# Patient Record
Sex: FEMALE | Race: AMERICAN INDIAN OR ALASKA NATIVE
[De-identification: names, ages, dates, MRNs, and addresses within clinical notes are randomized per-mention and may not be internally consistent; named-entity substitution may affect disease eponyms.]

---

## 2018-10-16 NOTE — CONSULTATION
History of Present Illness


Consult date: 10/16/18


Requesting physician: KAYLA GUIDRY


Reason for consult: other (Post cardiac arrest, syncope)


History of present illness: 





The pt is a 54 YO female with a past medical history of cardiomyopathy, s/p  

cardiac device (placed 8/2018), ETOH abuse, cocaine use, tobacco use.  She does 

not recall the events which lead to her hospitalization. She works for [employer] 

and was found unresponsive after reported possible fall at work. Per EMS, 

bystander started CPR and applied AED which applied one shock. On EMS arrival 

patient was without a pulse another shock applied per EMS. Return of 

spontaneous circulation achieved per EMS. On arrival to ED, patient  was 

responding to commands and moving all extremities but aphasic. 


She was admitted to the ICU for closer monitoring





Patient was seen and examined.


Vitals, labs,medications, chart and imaging were reviewed.


She is awake, alert, oriented.


Does not remember the immediate events that led to her collapse.





Past History


Past Medical History: heart failure


Past Surgical History: Other (AICD)


Social history: smoking, alcohol abuse, other (h/o cocaine use)





Medications and Allergies


 Allergies











Allergy/AdvReac Type Severity Reaction Status Date / Time


 


No Known Allergies Allergy   Unverified 10/16/18 08:20











Active Meds: 


Active Medications





Acetaminophen (Tylenol)  650 mg PO Q4H PRN


   PRN Reason: Fever >101


Aspirin (Aspirin)  325 mg PO QDAY [facility]


   Last Admin: 10/16/18 09:30 Dose:  325 mg


Heparin Sodium (Porcine) (Heparin)  5,000 unit SUB-Q Q12HR RONY


   Last Admin: 10/16/18 09:30 Dose:  5,000 unit


Ceftriaxone Sodium (Rocephin/Ns 1 Gm/50 Ml)  1 gm in 50 mls @ 100 mls/hr IV 

Q24HR RONY; Protocol


   Last Admin: 10/16/18 14:16 Dose:  100 mls/hr


Magnesium Sulfate (Magnesium Sulfate 4gm/100ml)  4 gm in 100 mls @ 25 mls/hr IV 

ONCE ONE


   Stop: 10/16/18 16:59


   Last Admin: 10/16/18 16:07 Dose:  25 mls/hr


Morphine Sulfate (Morphine)  2 mg IV Q3H PRN


   PRN Reason: Pain, Moderate (4-6)


   Last Admin: 10/16/18 14:35 Dose:  2 mg


Ondansetron HCl (Zofran)  4 mg IV Q8H PRN


   PRN Reason: Nausea And Vomiting


Potassium Chloride (Potassium Chloride)  40 meq PO ONCE ONE


   Stop: 10/16/18 18:01


Sodium Chloride (Sodium Chloride Flush Syringe 10 Ml)  10 ml IV PRN PRN


   PRN Reason: LINE FLUSH











Physical Examination


Vital signs: 


 Vital Signs











Pulse Resp BP Pulse Ox


 


 89   16   165/85   100 


 


 10/15/18 23:55  10/15/18 23:55  10/15/18 23:55  10/15/18 23:55














Results





- Laboratory Findings


CBC and BMP: 


 10/16/18 00:37





 10/16/18 15:00


PT/INR, D-dimer











PT  14.1 Sec. (12.2-14.9)   10/16/18  00:39    


 


INR  1.04  (0.87-1.13)   10/16/18  00:39    


 


D-Dimer  558.12 ng/mlDDU (0-234)  H  10/16/18  15:00    








Abnormal lab findings: 


 Abnormal Labs











  10/16/18 10/16/18 10/16/18





  00:37 00:37 05:48


 


WBC  12.2 H  


 


RBC  3.59 L  


 


MCH  33 H  


 


Mono % (Auto)  8.2 H  


 


Mono #  1.0 H  


 


Seg Neutrophils %  71.8 H  


 


Seg Neutrophils #  8.8 H  


 


D-Dimer   


 


Potassium   2.8 L* 


 


Carbon Dioxide   


 


Creatinine   


 


Glucose   124 H 


 


Total Creatine Kinase    530 H


 


CK-MB (CK-2)    5.9 H














  10/16/18 10/16/18





  15:00 15:00


 


WBC  


 


RBC  


 


MCH  


 


Mono % (Auto)  


 


Mono #  


 


Seg Neutrophils %  


 


Seg Neutrophils #  


 


D-Dimer   558.12 H


 


Potassium  


 


Carbon Dioxide  20 L 


 


Creatinine  0.6 L 


 


Glucose  120 H 


 


Total Creatine Kinase  


 


CK-MB (CK-2)  














Assessment and Plan





-Cardiac arrest with ROSC


Syncope


h/o Cardiomyopathy


h/o substance abuse disorder.


Aspiration pneumonitis








-Continue to monitor in the ICU, plan to transfer telemetry in the morning.


-AICD interrogation per cardiology


-Replete electrolytes


-Keep K>4 and Magnesium at 2


-Mobility


-Cardioprotective measures


-Substance abuse counselling


-Monitor off antibiotics, suspect this is aspiration pneumonitis.


-prn bronchodilators 


-Supplemental oxygen as indicated for O2 sats<88%





The high probability of a clinically significant, sudden or life-threatening 

deterioration of the [cardiac, respiratory] system(s) required my full and 

direct attention, intervention and personal management. The aggregate critical 

care time was [38] minutes without overlap. Time includes spent on;





[x] Data Review and interpretation 





[x] Patient assessment and monitoring of vital signs 





[x] Documentation 





[x] Medication orders and management

## 2018-10-16 NOTE — EVENT NOTE
Date: 10/16/18





Patient did not have access large enough for a CTA.  I was able to use 

ultrasound machine to find a 20-gauge IV in the right ac

## 2018-10-16 NOTE — CAT SCAN REPORT
FINAL REPORT



PROCEDURE:  CT CERVICAL SPINE WO CON



TECHNIQUE:  Computerized tomography of the cervical spine was

performed from the skull base to T1 without contrast material. 



HISTORY:  trauma 



COMPARISON:  No prior studies are available for comparison.



FINDINGS:  

There are no fractures or malalignments. There is no significant

degenerative disc change or facet arthropathy. The skull base and

foramen magnum are intact. Soft tissues are unremarkable. 



Images of the upper thorax demonstrate bilateral pulmonary

infiltrates. 



IMPRESSION:  

There is no acute traumatic bony injury..

## 2018-10-16 NOTE — CAT SCAN REPORT
FINAL REPORT



PROCEDURE:  CT HEAD/BRAIN WO CON



TECHNIQUE:  Computerized tomography of the head was performed

without contrast material. 



HISTORY:  trauma 



COMPARISON:  No prior studies are available for comparison.



FINDINGS:  

Skull and scalp: There is right frontal scalp swelling. There is

no skull fracture. 



Paranasal sinuses: Normal.



Ventricles and subarachnoid spaces: There is mild central and

cortical atrophy. There is no hydrocephalus or asymmetry..



Cerebrum: No evidence of hemorrhage, acute infarction or mass .



Cerebellum and brainstem: No evidence of hemorrhage, acute

infarction or mass.



IMPRESSION:  

There is right frontal scalp swelling. There is no skull

fracture. There is no intracranial hemorrhage.

## 2018-10-16 NOTE — EMERGENCY DEPARTMENT REPORT
<MIRELLA YODER - Last Filed: 10/16/18 05:00>





ED General Adult HPI





- General


Chief complaint: Syncope


Stated complaint: SYNCOPE


Time Seen by Provider: 10/15/18 23:56





- Related Data


 Allergies











Allergy/AdvReac Type Severity Reaction Status Date / Time


 


No Known Allergies Allergy   Unverified 10/16/18 08:20














ED Review of Systems


ROS: 


Stated complaint: SYNCOPE


Other details as noted in HPI








ED Course


 Vital Signs











  10/15/18 10/16/18 10/16/18





  23:55 00:00 00:35


 


Pulse Rate 89 94 H 91 H


 


Respiratory 16 16 14





Rate   


 


Blood Pressure  133/91 165/85


 


Blood Pressure 165/85  





[Left]   


 


O2 Sat by Pulse 100 90 99





Oximetry   














  10/16/18 10/16/18 10/16/18





  00:45 01:00 01:15


 


Pulse Rate 94 H 93 H 97 H


 


Respiratory 16 13 16





Rate   


 


Blood Pressure 136/85 138/80 146/94


 


Blood Pressure   





[Left]   


 


O2 Sat by Pulse 93 100 94





Oximetry   














  10/16/18 10/16/18 10/16/18





  01:30 01:35 01:45


 


Pulse Rate 93 H  91 H


 


Respiratory 10 L 14 16





Rate   


 


Blood Pressure 142/87  136/87


 


Blood Pressure   





[Left]   


 


O2 Sat by Pulse 98 100 99





Oximetry   














  10/16/18 10/16/18 10/16/18





  02:00 02:15 02:31


 


Pulse Rate 87 89 91 H


 


Respiratory 19 13 16





Rate   


 


Blood Pressure 138/80 142/87 142/87


 


Blood Pressure   





[Left]   


 


O2 Sat by Pulse 99 100 100





Oximetry   














  10/16/18 10/16/18 10/16/18





  02:45 03:01 03:15


 


Pulse Rate 93 H 88 89


 


Respiratory 18 18 14





Rate   


 


Blood Pressure 136/87 136/87 136/87


 


Blood Pressure   





[Left]   


 


O2 Sat by Pulse 100 100 100





Oximetry   














  10/16/18 10/16/18 10/16/18





  03:49 04:01 04:15


 


Pulse Rate  93 H 94 H


 


Respiratory  17 23





Rate   


 


Blood Pressure 136/87 136/87 136/87


 


Blood Pressure   





[Left]   


 


O2 Sat by Pulse 99 100 99





Oximetry   














  10/16/18 10/16/18 10/16/18





  04:55 05:00 05:15


 


Pulse Rate 96 H 87 84


 


Respiratory 18 16 17





Rate   


 


Blood Pressure 136/87 146/81 146/81


 


Blood Pressure   





[Left]   


 


O2 Sat by Pulse  98 100





Oximetry   














  10/16/18 10/16/18 10/16/18





  05:30 05:45 06:00


 


Pulse Rate 86 86 89


 


Respiratory 15 18 18





Rate   


 


Blood Pressure 135/83 137/92 138/91


 


Blood Pressure   





[Left]   


 


O2 Sat by Pulse 100 99 94





Oximetry   














  10/16/18 10/16/18 10/16/18





  06:15 06:30 07:00


 


Pulse Rate 83 86 79


 


Respiratory 15 15 15





Rate   


 


Blood Pressure 135/83 127/73 126/71


 


Blood Pressure   





[Left]   


 


O2 Sat by Pulse 100 93 96





Oximetry   














  10/16/18 10/16/18 10/16/18





  07:15 07:30 07:45


 


Pulse Rate 78 78 83


 


Respiratory 14 14 14





Rate   


 


Blood Pressure 123/67 118/69 120/75


 


Blood Pressure   





[Left]   


 


O2 Sat by Pulse 95 99 97





Oximetry   














ED Medical Decision Making





- Lab Data


Result diagrams: 


 10/16/18 00:37





 10/16/18 00:37








 Lab Results











  10/16/18 10/16/18 10/16/18 Range/Units





  00:37 00:37 00:37 


 


WBC  12.2 H    (4.5-11.0)  K/mm3


 


RBC  3.59 L    (3.65-5.03)  M/mm3


 


Hgb  11.9    (10.1-14.3)  gm/dl


 


Hct  34.7    (30.3-42.9)  %


 


MCV  97    (79-97)  fl


 


MCH  33 H    (28-32)  pg


 


MCHC  34    (30-34)  %


 


RDW  14.4    (13.2-15.2)  %


 


Plt Count  163    (140-440)  K/mm3


 


Lymph % (Auto)  18.3    (13.4-35.0)  %


 


Mono % (Auto)  8.2 H    (0.0-7.3)  %


 


Eos % (Auto)  1.4    (0.0-4.3)  %


 


Baso % (Auto)  0.3    (0.0-1.8)  %


 


Lymph #  2.2    (1.2-5.4)  K/mm3


 


Mono #  1.0 H    (0.0-0.8)  K/mm3


 


Eos #  0.2    (0.0-0.4)  K/mm3


 


Baso #  0.0    (0.0-0.1)  K/mm3


 


Seg Neutrophils %  71.8 H    (40.0-70.0)  %


 


Seg Neutrophils #  8.8 H    (1.8-7.7)  K/mm3


 


PT     (12.2-14.9)  Sec.


 


INR     (0.87-1.13)  


 


APTT     (24.2-36.6)  Sec.


 


Thrombin Time     (15.1-19.6)  Sec.


 


Sodium   141   (137-145)  mmol/L


 


Potassium   2.8 L*   (3.6-5.0)  mmol/L


 


Chloride   101.6   ()  mmol/L


 


Carbon Dioxide   25   (22-30)  mmol/L


 


Anion Gap   17   mmol/L


 


BUN   11   (7-17)  mg/dL


 


Creatinine   0.7   (0.7-1.2)  mg/dL


 


Estimated GFR   > 60   ml/min


 


BUN/Creatinine Ratio   16   %


 


Glucose   124 H   ()  mg/dL


 


Calcium   9.5   (8.4-10.2)  mg/dL


 


Troponin T    < 0.010  (0.00-0.029)  ng/mL














  10/16/18 Range/Units





  00:39 


 


WBC   (4.5-11.0)  K/mm3


 


RBC   (3.65-5.03)  M/mm3


 


Hgb   (10.1-14.3)  gm/dl


 


Hct   (30.3-42.9)  %


 


MCV   (79-97)  fl


 


MCH   (28-32)  pg


 


MCHC   (30-34)  %


 


RDW   (13.2-15.2)  %


 


Plt Count   (140-440)  K/mm3


 


Lymph % (Auto)   (13.4-35.0)  %


 


Mono % (Auto)   (0.0-7.3)  %


 


Eos % (Auto)   (0.0-4.3)  %


 


Baso % (Auto)   (0.0-1.8)  %


 


Lymph #   (1.2-5.4)  K/mm3


 


Mono #   (0.0-0.8)  K/mm3


 


Eos #   (0.0-0.4)  K/mm3


 


Baso #   (0.0-0.1)  K/mm3


 


Seg Neutrophils %   (40.0-70.0)  %


 


Seg Neutrophils #   (1.8-7.7)  K/mm3


 


PT  14.1  (12.2-14.9)  Sec.


 


INR  1.04  (0.87-1.13)  


 


APTT  29.2  (24.2-36.6)  Sec.


 


Thrombin Time  15.5  (15.1-19.6)  Sec.


 


Sodium   (137-145)  mmol/L


 


Potassium   (3.6-5.0)  mmol/L


 


Chloride   ()  mmol/L


 


Carbon Dioxide   (22-30)  mmol/L


 


Anion Gap   mmol/L


 


BUN   (7-17)  mg/dL


 


Creatinine   (0.7-1.2)  mg/dL


 


Estimated GFR   ml/min


 


BUN/Creatinine Ratio   %


 


Glucose   ()  mg/dL


 


Calcium   (8.4-10.2)  mg/dL


 


Troponin T   (0.00-0.029)  ng/mL














- Radiology Data





CTA of the head and neck are within normal limits





- Medical Decision Making





The patient is a 53-year-old female who had a episode of cardiac arrest.  

Patient was shocked twice prior to arrival.  Patient arrived alert but unable 

to speak.  Patient was made a code stroke and was sent for his emergent CT of 

the head.  No bleeds were seen.  Was advised that the patient get a CTA of the 

head and neck which also was negative.  Patient has returned back to her 

baseline.  Patient is able to speak in the lower extremities as is.  At the 

time of admission patient's NIH was 0.  Patient also complains some low back 

discomfort and x-ray of been ordered.  Patient be admitted to the hospitalist 

service at this time.


Critical care attestation.: 


If time is entered above; I have spent that time in minutes in the direct care 

of this critically ill patient, excluding procedure time.








ED Disposition


Clinical Impression: 


 Cardiac arrest, Hypokalemia, TIA (transient ischemic attack)





Dysrhythmia, cardiac


Qualifiers:


 Arrhythmia type: unspecified cardiac arrhythmia Qualified Code(s): I49.9 - 

Cardiac arrhythmia, unspecified





Disposition: DC-09 OP ADMIT IP TO THIS HOSP


Is pt being admited?: Yes


Does the pt Need Aspirin: Yes


Condition: Serious


Time of Disposition: 05:05





<XIMENA CARDONA - Last Filed: 10/16/18 17:00>





ED General Adult HPI





- General


Source: patient, EMS


Limitations: Altered Mental Status





- History of Present Illness


Initial comments: 





53-year-old female presents with EMS after being found unresponsive after 

reported possible fall at work.  Per EMS bystander started CPR and applied AED 

which applied one shock.  On EMS arrival patient was without a pulse another 

shock applied per EMS.  Return of spontaneous circulation achieved per EMS.  On 

arrival patient is responding to commands and moving all extremities but 

aphasic.  Frontal hematoma noted on exam pupils equal and reactive c-collar 

placed for precautions.  Patient diaphoretic with urinary incontinence.  CT 

stroke protocol initiated for immediate CT.


MD Complaint: syncope





ED Review of Systems


Comment: Unobtainable due to pts medical conditions





ED Physical Exam





- General


Limitations: Altered Mental Status


General appearance: alert, in distress, other (patient following commands and 

moving all extremities)





- Head


Head exam: Present: other (frontal hematoma noted.  No johnston sign no 

hemotympanum.)





- Eye


Eye exam: Present: PERRL


Pupils: Present: normal accommodation





- ENT


ENT exam: Present: normal orophraynx, mucous membranes moist, TM's normal 

bilaterally





- Neck


Neck exam: Absent: tenderness, meningismus





- Respiratory


Respiratory exam: Present: normal lung sounds bilaterally, other (patient 

protecting airway, gag reflex intact).  Absent: respiratory distress, wheezes, 

rales





- Cardiovascular


Cardiovascular Exam: Present: regular rate, normal heart sounds





- GI/Abdominal


GI/Abdominal exam: Present: soft.  Absent: distended, tenderness, guarding, 

rebound





- Rectal


Rectal exam: Present: deferred





- Extremities Exam


Extremities exam: Present: full ROM.  Absent: pedal edema, joint swelling





- Back Exam


Back exam: Present: normal inspection.  Absent: tenderness





- Neurological Exam


Neurological exam: Present: alert (nonverbal)





- Skin


Skin exam: Present: diaphoretic





ED Course


 Vital Signs











  10/15/18 10/16/18 10/16/18





  23:55 00:00 00:35


 


Pulse Rate 89 94 H 91 H


 


Respiratory 16 16 14





Rate   


 


Blood Pressure  133/91 165/85


 


Blood Pressure 165/85  





[Left]   


 


O2 Sat by Pulse 100 90 99





Oximetry   














  10/16/18 10/16/18 10/16/18





  00:45 01:00 01:15


 


Pulse Rate 94 H 93 H 97 H


 


Respiratory 16 13 16





Rate   


 


Blood Pressure 136/85 138/80 146/94


 


Blood Pressure   





[Left]   


 


O2 Sat by Pulse 93 100 94





Oximetry   














  10/16/18 10/16/18 10/16/18





  01:30 01:35 01:45


 


Pulse Rate 93 H  91 H


 


Respiratory 10 L 14 16





Rate   


 


Blood Pressure 142/87  136/87


 


Blood Pressure   





[Left]   


 


O2 Sat by Pulse 98 100 99





Oximetry   














  10/16/18 10/16/18 10/16/18





  02:00 02:15 02:31


 


Pulse Rate 87 89 91 H


 


Respiratory 19 13 16





Rate   


 


Blood Pressure 138/80 142/87 142/87


 


Blood Pressure   





[Left]   


 


O2 Sat by Pulse 99 100 100





Oximetry   














  10/16/18 10/16/18 10/16/18





  02:45 03:01 03:15


 


Pulse Rate 93 H 88 89


 


Respiratory 18 18 14





Rate   


 


Blood Pressure 136/87 136/87 136/87


 


Blood Pressure   





[Left]   


 


O2 Sat by Pulse 100 100 100





Oximetry   














  10/16/18 10/16/18 10/16/18





  03:49 04:01 04:15


 


Pulse Rate  93 H 94 H


 


Respiratory  17 23





Rate   


 


Blood Pressure 136/87 136/87 136/87


 


Blood Pressure   





[Left]   


 


O2 Sat by Pulse 99 100 99





Oximetry   














  10/16/18 10/16/18 10/16/18





  04:55 05:00 05:15


 


Pulse Rate 96 H 87 84


 


Respiratory 18 16 17





Rate   


 


Blood Pressure 136/87 146/81 146/81


 


Blood Pressure   





[Left]   


 


O2 Sat by Pulse  98 100





Oximetry   














  10/16/18 10/16/18 10/16/18





  05:30 05:45 06:00


 


Pulse Rate 86 86 89


 


Respiratory 15 18 18





Rate   


 


Blood Pressure 135/83 137/92 138/91


 


Blood Pressure   





[Left]   


 


O2 Sat by Pulse 100 99 94





Oximetry   














  10/16/18 10/16/18 10/16/18





  06:15 06:30 07:00


 


Pulse Rate 83 86 79


 


Respiratory 15 15 15





Rate   


 


Blood Pressure 135/83 127/73 126/71


 


Blood Pressure   





[Left]   


 


O2 Sat by Pulse 100 93 96





Oximetry   














  10/16/18 10/16/18 10/16/18





  07:15 07:30 07:45


 


Pulse Rate 78 78 83


 


Respiratory 14 14 14





Rate   


 


Blood Pressure 123/67 118/69 120/75


 


Blood Pressure   





[Left]   


 


O2 Sat by Pulse 95 99 97





Oximetry   














- Reevaluation(s)


Reevaluation #1: 





10/16/18 00:10


Patient alert but nonverbal, protecting airway.  Vital signs reviewed.  Patient 

stable for transport for immediate CT scan.


Reevaluation #2: 





10/16/18 00:58


Patient now alert and oriented 3 and reports of pain in her head over the 

hematoma.  Pt moving all extremities.  Pt denies any sx prior to passing out.  

Pt denies any current pain








- Consultations


Consultation #1: 





10/16/18 00:49


Discussed case with Dr. Tobias (neurology) who agrees with management thus far.  

Agrees no TPA at this time secondary to likely trauma.  Recommendation for CT 

angiogram of head and neck.





ED Medical Decision Making





- Lab Data


Result diagrams: 


 10/16/18 00:37





 10/16/18 15:00





- EKG Data





10/16/18 01:15


Ventricular paced rhythm.  Left bundle branch block.  Rate of 95.





- Medical Decision Making





Reassessment patient is alert and oriented 3 waiting CT angiogram study.  

Patient to be signed out to Dr. Yoder for follow-up on CT studies and 

admission.


Critical care time in (mins) excluding proc time.: 45





- Level of Consciousness


1a. Level of Consciousness: alert/keenly responsive





- LOC Questions


1b. LOC Questions: answers no questions correctly





- LOC Command


1c. LOC Commands: performs tasks correctly





- Best Gaze


2. Best Gaze: partial gaze palsy





- Visual


3. Visual: no visual loss





- Facial Palsy


4. Facial Palsy: normal symmetrical movement





- Motor Arm


5b. Motor Arm Right: no drift


5a. Motor Arm Left: no drift





- Motor Leg


6a. Motor Leg Left: no drift


6b. Motor Leg Right: no drift





- Limb Ataxia


7. Limb Ataxia: absent





- Sensory


8. Sensory: normal





- Best Language


9. Best Language: severe aphasia





- Dysarthria


10. Dysarthria: mild/moderate dysarthria





- Extinction and Inattention


11. Extinction/Inattention: no abnormality (No TPA given secondary to possible 

head trauma)





- Scoring


Total Score: 6


Stroke Severity: Moderate Stroke

## 2018-10-16 NOTE — XRAY REPORT
FINAL REPORT



PROCEDURE:  XR CHEST 1V AP



TECHNIQUE:  Chest radiograph anteroposterior view. CPT 72871







HISTORY:  Chest Pain 



COMPARISON:  No prior studies are available for comparison.



FINDINGS:  

Heart: Heart size is normal. 



Mediastinum/Vessels: Normal.



Lungs/Pleural space: There are infiltrates throughout the right

lung. The left lung is clear. There are no effusions or

pneumothoraces..



Bony thorax: No acute osseous abnormality.



Life support devices: Pacemaker leads are in proper position..



IMPRESSION:  





Heart size is normal. 



There are infiltrates throughout the right lung. The left lung is

clear. There are no effusions or pneumothoraces..



Pacemaker leads are in proper position..



.

## 2018-10-16 NOTE — XRAY REPORT
FINAL REPORT



PROCEDURE:  XR SPINE LUMBOSACRAL 2-3V



TECHNIQUE:  Lumbar spine radiographs, including AP, lateral, and

lumbosacral spot views. CPT 12624 







HISTORY:  fall 



COMPARISON:  No prior studies are available for comparison.



FINDINGS:  

There is mild scoliosis with convexity to the right. There are no

fractures. There is grade 1 anterior spondylolisthesis of L4 over

L5 possibly due to facet arthropathy. 



There is bilateral facet arthropathy at L3-L4, L4-5 and L5-S1.



Disc heights are within normal limits. Soft tissues are

unremarkable. 



IMPRESSION:  

The there are degenerative changes as described. There is no

acute traumatic injury..

## 2018-10-16 NOTE — CAT SCAN REPORT
FINAL REPORT



PROCEDURE:  CT ANGIO HEAD



TECHNIQUE:  Computerized tomographic angiography of the head was

performed after the IV injection of iodinated nonionic contrast

including image processing. The image data was postprocessed

using 2-dimensional multiplanar reformatted (MPR) and

3-dimensional (MIP and/or volume rendered) techniques. 



HISTORY:  cva 



COMPARISON:  CT of the head the 10/15/2018



FINDINGS:  

Intracranial vessels: 



Carotid siphon: There is calcified plaque in the cavernous

portions of the internal carotid arteries without stenosis.



Anterior cerebral: Normal.



Middle cerebral: Normal.



Posterior cerebral:There is fetal origin of the posterior

cerebral arteries which is a normal variation.



Vertebral arteries including basilar: Vertebral arteries and

basilar artery are small in caliber but patent. 



Aneurysms: None.



Dural sinuses: Normal.



IMPRESSION:  

There is no intracranial arterial stenosis or occlusion. There is

no aneurysm or vascular malformation.

## 2018-10-16 NOTE — PROGRESS NOTE
Assessment and Plan


Assessment and plan: 





S/p cardiopulmonary arrest.  Patient with successful resuscitation.  Continue 

per cardiology recommendations.


-Natan negative for AMI. ECG shows NAF. 


-Attempt to obtain [facility] records.


-Follow up echocardiogram


-Interrogate AICD





Cardiomyopathy with AICD in situ.  As above 





History of EtOH/cocaine abuse.  Continue withdrawal protocols.





Pneumonia.  Likely aspiration secondary from cardiopulmonary arrest.  Start IV 

antibiotic for follow-up serial chest x-ray





Hypokalemia.  Replete potassium.





History


Interval history: 





The pt is a 54 YO female with a past medical history of cardiomyopathy, ? AICD 

in situ (placed 8/2018), ETOH abuse, cocaine use, tobacco use.  She is 

regularly followed by [facility]. She does not recall the events which 

lead to her hospitalization. She works for [employer] and was found unresponsive 

after reported possible fall at work. Per EMS, bystander started CPR and 

applied AED which applied one shock. On EMS arrival, patient was without a 

pulse another shock applied per EMS. Return of spontaneous circulation achieved 

per EMS. On arrival to ED, pt was responding to commands and moving all 

extremities but aphasic.  Presently, pt is A&O with no current cardiac 

complaints. Pt denies any prior CAD, AMI or arrhythmias. She does not remember 

the events leading up to her admission. 





Hospitalist Physical





- Constitutional


Vitals: 


 











Temp Pulse Resp BP Pulse Ox


 


    83   14   120/75   96 


 


    10/16/18 07:45  10/16/18 07:45  10/16/18 07:45  10/16/18 08:44











General appearance: Present: no acute distress





- EENT


Eyes: Present: PERRL, EOM intact


ENT: hearing intact, clear oral mucosa, dentition normal





- Neck


Neck: Present: supple, normal ROM





- Respiratory


Respiratory effort: normal


Respiratory: bilateral: CTA





- Cardiovascular


Rhythm: regular


Heart Sounds: Present: S1 & S2.  Absent: gallop, rub





- Extremities


Extremities: no ischemia, No edema, Full ROM





- Abdominal


General gastrointestinal: soft, non-tender, non-distended, normal bowel sounds





- Integumentary


Integumentary: Present: clear, warm, dry





- Neurologic


Neurologic: CNII-XII intact, moves all extremities





Results





- Labs


CBC & Chem 7: 


 10/16/18 00:37





 10/16/18 00:37


Labs: 


 Laboratory Last Values











WBC  12.2 K/mm3 (4.5-11.0)  H  10/16/18  00:37    


 


RBC  3.59 M/mm3 (3.65-5.03)  L  10/16/18  00:37    


 


Hgb  11.9 gm/dl (10.1-14.3)   10/16/18  00:37    


 


Hct  34.7 % (30.3-42.9)   10/16/18  00:37    


 


MCV  97 fl (79-97)   10/16/18  00:37    


 


MCH  33 pg (28-32)  H  10/16/18  00:37    


 


MCHC  34 % (30-34)   10/16/18  00:37    


 


RDW  14.4 % (13.2-15.2)   10/16/18  00:37    


 


Plt Count  163 K/mm3 (140-440)   10/16/18  00:37    


 


Lymph % (Auto)  18.3 % (13.4-35.0)   10/16/18  00:37    


 


Mono % (Auto)  8.2 % (0.0-7.3)  H  10/16/18  00:37    


 


Eos % (Auto)  1.4 % (0.0-4.3)   10/16/18  00:37    


 


Baso % (Auto)  0.3 % (0.0-1.8)   10/16/18  00:37    


 


Lymph #  2.2 K/mm3 (1.2-5.4)   10/16/18  00:37    


 


Mono #  1.0 K/mm3 (0.0-0.8)  H  10/16/18  00:37    


 


Eos #  0.2 K/mm3 (0.0-0.4)   10/16/18  00:37    


 


Baso #  0.0 K/mm3 (0.0-0.1)   10/16/18  00:37    


 


Seg Neutrophils %  71.8 % (40.0-70.0)  H  10/16/18  00:37    


 


Seg Neutrophils #  8.8 K/mm3 (1.8-7.7)  H  10/16/18  00:37    


 


PT  14.1 Sec. (12.2-14.9)   10/16/18  00:39    


 


INR  1.04  (0.87-1.13)   10/16/18  00:39    


 


APTT  29.2 Sec. (24.2-36.6)   10/16/18  00:39    


 


Thrombin Time  15.5 Sec. (15.1-19.6)   10/16/18  00:39    


 


Sodium  141 mmol/L (137-145)   10/16/18  00:37    


 


Potassium  2.8 mmol/L (3.6-5.0)  L*  10/16/18  00:37    


 


Chloride  101.6 mmol/L ()   10/16/18  00:37    


 


Carbon Dioxide  25 mmol/L (22-30)   10/16/18  00:37    


 


Anion Gap  17 mmol/L  10/16/18  00:37    


 


BUN  11 mg/dL (7-17)   10/16/18  00:37    


 


Creatinine  0.7 mg/dL (0.7-1.2)   10/16/18  00:37    


 


Estimated GFR  > 60 ml/min  10/16/18  00:37    


 


BUN/Creatinine Ratio  16 %  10/16/18  00:37    


 


Glucose  124 mg/dL ()  H  10/16/18  00:37    


 


Calcium  9.5 mg/dL (8.4-10.2)   10/16/18  00:37    


 


Magnesium  1.70 mg/dL (1.7-2.3)   10/16/18  05:48    


 


Total Creatine Kinase  530 units/L ()  H  10/16/18  05:48    


 


CK-MB (CK-2)  5.9 ng/mL (0.0-4.0)  H  10/16/18  05:48    


 


CK-MB (CK-2) Rel Index  1.1  (0-4)   10/16/18  05:48    


 


Troponin T  0.022 ng/mL (0.00-0.029)   10/16/18  05:48

## 2018-10-16 NOTE — CONSULTATION
History of Present Illness


Consult date: 10/16/18


Requesting physician: CARLITA ENCARNACION


Consult reason: cardiac arrest, syncope


History of present illness: 


The pt is a 52 YO female with a past medical history of cardiomyopathy, ? AICD 

in situ (placed 8/2018), ETOH abuse, cocaine use, tobacco use. She is 

previously unknown to our practice and reports that she is regularly followed 

by [facility]. She does not recall the events which lead to her 

hospitalization. She works for [employer] and was found unresponsive after reported 

possible fall at work. Per EMS, bystander started CPR and applied AED which 

applied one shock. On EMS arrival patient was without a pulse another shock 

applied per EMS. Return of spontaneous circulation achieved per EMS. On arrival 

to ED, pt was responding to commands and moving all extremities but aphasic. On 

evaluation this AM, pt is A&O with no current cardiac complaints. Pt denies any 

prior CAD, AMI or arrhythmias. She is unsure if her AICD fired yesterday 

because she does not remember the events of yesterday. She does not know which 

company her AICD belongs to.  





Past History


Past Medical History: heart failure


Past Surgical History: Other (AICD)


Social history: smoking, alcohol abuse, other (h/o cocaine use)





Medications and Allergies


 Allergies











Allergy/AdvReac Type Severity Reaction Status Date / Time


 


No Known Allergies Allergy   Unverified 10/16/18 08:20











Active Meds: 


Active Medications





Acetaminophen (Tylenol)  650 mg PO Q4H PRN


   PRN Reason: Fever >101


Aspirin (Aspirin)  325 mg PO QDAY [facility]


   Last Admin: 10/16/18 09:30 Dose:  325 mg


Heparin Sodium (Porcine) (Heparin)  5,000 unit SUB-Q Q12HR [facility]


   Last Admin: 10/16/18 09:30 Dose:  5,000 unit


Morphine Sulfate (Morphine)  2 mg IV Q3H PRN


   PRN Reason: Pain, Moderate (4-6)


Ondansetron HCl (Zofran)  4 mg IV Q8H PRN


   PRN Reason: Nausea And Vomiting


Potassium Chloride (Potassium Chloride)  40 meq PO ONCE ONE


   Stop: 10/16/18 12:01


Potassium Chloride (Potassium Chloride)  40 meq PO ONCE ONE


   Stop: 10/16/18 18:01


Sodium Chloride (Sodium Chloride Flush Syringe 10 Ml)  10 ml IV PRN PRN


   PRN Reason: LINE FLUSH











Review of Systems


All systems: negative


Cardiovascular: syncope, no chest pain, no shortness of breath, no dyspnea on 

exertion


Neurological: syncope





Physical Examination


 Vital Signs











Pulse Resp BP Pulse Ox


 


 89   16   165/85   100 


 


 10/15/18 23:55  10/15/18 23:55  10/15/18 23:55  10/15/18 23:55











General appearance: no acute distress


HEENT: Positive: PERRL, Normocephaly, Mucus Membranes Moist


Neck: Positive: neck supple, trachea midline


Cardiac: Positive: Reg Rate and Rhythm, S1/S2


Lungs: Positive: clear to auscultation


Neuro: Positive: Grossly Intact


Abdomen: Positive: Soft.  Negative: Tender


Skin: Positive: Clear.  Negative: Rash, Wound


Musculoskeletal: No Pain


Extremities: Absent: edema





Results





 10/16/18 00:37





 10/16/18 00:37


 Cardiac Enzymes











  10/16/18 Range/Units





  05:48 


 


CK-MB (CK-2)  5.9 H  (0.0-4.0)  ng/mL








 Coagulation











  10/16/18 Range/Units





  00:39 


 


PT  14.1  (12.2-14.9)  Sec.


 


INR  1.04  (0.87-1.13)  


 


APTT  29.2  (24.2-36.6)  Sec.








 CBC











  10/16/18 Range/Units





  00:37 


 


WBC  12.2 H  (4.5-11.0)  K/mm3


 


RBC  3.59 L  (3.65-5.03)  M/mm3


 


Hgb  11.9  (10.1-14.3)  gm/dl


 


Hct  34.7  (30.3-42.9)  %


 


Plt Count  163  (140-440)  K/mm3


 


Lymph #  2.2  (1.2-5.4)  K/mm3


 


Mono #  1.0 H  (0.0-0.8)  K/mm3


 


Eos #  0.2  (0.0-0.4)  K/mm3


 


Baso #  0.0  (0.0-0.1)  K/mm3








 Comprehensive Metabolic Panel











  10/16/18 Range/Units





  00:37 


 


Sodium  141  (137-145)  mmol/L


 


Potassium  2.8 L*  (3.6-5.0)  mmol/L


 


Chloride  101.6  ()  mmol/L


 


Carbon Dioxide  25  (22-30)  mmol/L


 


BUN  11  (7-17)  mg/dL


 


Creatinine  0.7  (0.7-1.2)  mg/dL


 


Glucose  124 H  ()  mg/dL


 


Calcium  9.5  (8.4-10.2)  mg/dL














- Imaging and Cardiology


Echo: pending


EKG: report reviewed, image reviewed





EKG interpretations





- Telemetry


EKG Rhythm: Paced





- EKG


Ventricular dysrhythmias: ventricular premature com


Pacemaker: atrial pacing w/capture





Assessment and Plan


Natan negative for AMI. ECG shows NAF. 


Attempt to obtain [facility] records. 


Obtain echo. 


Replete K+. 


Interrogate AICD. 





The patient has been seen in conjunction with Dr. Olmstead who agrees with the 

assessment and plan of care. 








- Patient Problems


(1) Cardiac arrest with successful resuscitation


Current Visit: Yes   Status: Acute   





(2) History of cardiomyopathy


Current Visit: Yes   Status: Chronic   





(3) Automatic implantable cardioverter-defibrillator in situ


Current Visit: Yes   Status: Chronic   





(4) History of ETOH abuse


Current Visit: Yes   Status: Chronic   





(5) History of cocaine abuse


Current Visit: Yes   Status: Chronic   





(6) Tobacco use


Current Visit: Yes   Status: Chronic   





(7) Pneumonia


Current Visit: Yes   Status: Suspected   





(8) Hypokalemia


Current Visit: Yes   Status: Acute

## 2018-10-16 NOTE — HISTORY AND PHYSICAL REPORT
CHIEF COMPLAINT:  Syncopal attack.



HISTORY OF PRESENTING ILLNESS:  The patient is a 53-year-old female who was 
noted

to have passed out at work and became unresponsive and the patient says she

could not remember what happened before she passed out and the bystander started

CPR and applied automated defibrillator to patient and shocked her once and then

EMS was called.  When EMS arrived, they found patient with no pulse and gave

another shock and did some CPR on patient and patient regained spontaneous

circulation and patient was brought to the Emergency Room and on arrival to the

Emergency Room, the patient was responding to commands and moving all

extremities but was not talking.  The patient was also noted to be diaphoretic

with urinary incontinence.  The patient was  presented for admission.



PAST MEDICAL HISTORY:  Pertinent for ill-defined heart disease.



PAST SURGICAL HISTORY:  Pertinent for pacemaker placement.



FAMILY HISTORY:  Family history is noncontributory.



SOCIAL HISTORY:  The patient does not smoke, does not drink alcohol and does not

use illicit drugs.



MEDICATIONS:  The patient's home medications are not known at this time.



ALLERGIES:  There are no known drug allergies.



REVIEW OF SYSTEMS:

CONSTITUTIONAL:  There is no fever, no chills.  Diaphoresis is present.

HEENT:  There is no headache or sore throat.

CARDIOVASCULAR:  There is no chest pain or orthopnea.

RESPIRATORY:  There is no shortness of breath or cough.

GASTROINTESTINAL SYSTEM:  There is no nausea, no vomiting, no abdominal pain,

diarrhea, or constipation.

NEUROLOGIC:  Syncopal attack noted, unresponsiveness was noted and aphasia of

speech disturbance noted.

MUSCULOSKELETAL SYSTEM:  There is no joint pain or swelling.

DERMATOLOGICAL SYSTEM:  There is no skin rash or itching.

GENITOURINARY SYSTEM:  There is no dysuria, hematuria, or flank pain.



Rest of system review is normal.



PHYSICAL EXAMINATION:

GENERAL:  At this time of exam, the patient was found to be alert, oriented x 3,

looking weak, but not in acute disease.  

VITAL SIGNS:  Initial vital signs at the time of presentation showed pulse of

89, respiration 16, blood pressure 165/85, O2 saturation of 100% on room air.

HEENT:  Show pupils are equal, round, reactive to light and accommodating. 

Extraocular muscles are intact.  

NECK:  Neck is supple with no JVD or carotid bruit.

CARDIOVASCULAR:  Showed normal first and second heart sounds with no gallops or

murmurs.

RESPIRATORY SYSTEM:  Show good air entry on both sides of the lungs with no

abnormal breath sounds.

GASTROINTESTINAL SYSTEM:  Show abdomen to be full, soft, nontender with no

organomegaly or rigidity.

NEUROLOGICAL:  Neuro exam shows no focal deficits.

MUSCULOSKELETAL:  Shows no joint swelling or tenderness.

DERMATOLOGICAL SYSTEM:  Show no skin rash.

GENITOURINARY:  Showing no costovertebral angle tenderness.



PERTINENT LABORATORY DATA AND IMAGING STUDIES:  The patient had chest done that

shows right lung infiltrate with clear left lung.  The patient has cervical

spine CT done that shows no fractures or acute lesion.  The patient had CT of

the head without contrast done that showed right frontal scalp swelling with no

fracture in the skull and no intracranial hemorrhage.  The patient had a CT

angiogram of head and neck done that were unremarkable and patient's lumbar

spine x-ray showed degenerative joint disease.  The patient's lab result shows

CBC with elevated white count of 12,200 with normal hemoglobin and normal

hematocrit and CBC differential showing elevated segmented neutrophil of 71.8. 

The patient's coagulation studies were unremarkable and chemistry showed low

potassium level of 2.8.



DIAGNOSES:

1.  Post cardiac arrest state.

2.  Syncope.

3.  Hypokalemia.



PLAN OF ACTION:

1.  The patient will be admitted to the ICU.

2.  The patient will have cardiac enzymes involving troponin, total CK and CK-MB

checked q.  6 hours x 2 more levels.  

3.  The patient will have complete echocardiogram done this morning and will

also have bilateral carotid Doppler done because of the syncopal attack.

4.   The patient will have Cardiology consult with Dr. Ellsworth because of

cardiac arrest requiring cardiac shock.  

5.  The patient will have a Critical Care consult with Dr. Martin because of

ICU admission.

6.  The patient will be on p.r.n. medications like Tylenol 650 mg by mouth every

4 hours for fever and headache and aspirin 325 mg daily by mouth.  The patient

will also be on IV morphine 2 mg every 3 hours as needed for pain and IV Zofran

4 mg every 8 hours as needed for nausea and vomiting and patient will have

additional 10 mEq of KCl given in addition to the first order for 10 mEq.





DD: 10/16/2018 06:02

DT: 10/16/2018 07:13

JOB# [id number]  [id number]

OCN/NTS

LAURENCED

## 2018-10-16 NOTE — CAT SCAN REPORT
FINAL REPORT



PROCEDURE:  CT ANGIO NECK



TECHNIQUE:  Computerized tomographic angiography of the neck was

performed after the IV injection of iodinated nonionic contrast

including image processing. The image data was postprocessed

using 2-dimensional multiplanar reformatted (MPR) and

3-dimensional (MIP and/or volume rendered) techniques. 



HISTORY:  cva 



COMPARISON:  No prior studies are available for comparison.



Note: Assessment of carotid artery stenosis is based on 

measurement of the distal internal carotid artery diameter as the

denominator for stenosis calculations and the [employer] American

Symptomatic Carotid Endarterectomy Trial (NASCET) stenosis

criteria . CPT 3100F



FINDINGS:  

Sinuses: Normal .



Non vascular cervical structures: No significant abnormality .



Aortic arch: Normal .



Right carotid artery: Normal .



Left carotid artery: Normal .



Vertebral arteries: Left vertebral artery is slightly dominant.

This is a normal variation..



There are infiltrates at the lung apices.



IMPRESSION:  

There is no cervical carotid or vertebral artery stenosis or

dissection.

## 2018-10-17 NOTE — PROGRESS NOTE
Assessment and Plan


Assessment and plan: 





S/p cardiopulmonary arrest.  Patient with successful resuscitation.  Continue 

per cardiology recommendations.


-Natan negative for AMI. ECG shows NAF. 


-Attempt to obtain [facility] records.


-Echocardiogram revealed left ventricular systolic function severely decreased 

with EF of 20-25%.  Right ventricular global systolic function is normal.  

Right ventricular systolic pressure is calculated at 50 mmHg with evidence of 

moderate pulmonary hypertension


-Interrogate AICD





Pulmonary hypertension.





Cardiomyopathy with AICD in situ.  As above 





History of EtOH/cocaine abuse.  Continue withdrawal protocols.





Pneumonia.  Likely aspiration secondary from cardiopulmonary arrest.  Cont. IV 

antibiotic and follow-up serial chest x-ray





Hypokalemia.  Replete potassium as needed.





History


Interval history: 





The pt is a 52 YO female with a past medical history of cardiomyopathy, ? AICD 

in situ (placed 8/2018), ETOH abuse, cocaine use, tobacco use.  She is 

regularly followed by [facility]. She does not recall the events which 

lead to her hospitalization. She works for [email or web address] and was found unresponsive 

after reported possible fall at work. Per EMS, bystander started CPR and 

applied AED which applied one shock. On EMS arrival, patient was without a 

pulse another shock applied per EMS. Return of spontaneous circulation achieved 

per EMS. On arrival to ED, pt was responding to commands and moving all 

extremities but aphasic.  Presently, pt is A&O with no current cardiac 

complaints. Pt denies any prior CAD, AMI or arrhythmias. She does not remember 

the events leading up to her admission. 





Hospitalist Physical





- Constitutional


Vitals: 


 











Temp Pulse Resp BP Pulse Ox


 


 98.3 F   98 H  12   114/77   92 


 


 10/17/18 08:00  10/17/18 12:30  10/17/18 04:40  10/17/18 12:30  10/17/18 12:30











General appearance: Present: no acute distress





- EENT


Eyes: Present: PERRL, EOM intact


ENT: hearing intact, clear oral mucosa, dentition normal





- Neck


Neck: Present: supple, normal ROM





- Respiratory


Respiratory effort: normal


Respiratory: bilateral: CTA





- Cardiovascular


Rhythm: regular


Heart Sounds: Present: S1 & S2.  Absent: gallop, rub





- Extremities


Extremities: no ischemia, No edema, Full ROM





- Abdominal


General gastrointestinal: soft, non-tender, non-distended, normal bowel sounds





- Integumentary


Integumentary: Present: clear, warm, dry





- Neurologic


Neurologic: CNII-XII intact, moves all extremities





Results





- Labs


CBC & Chem 7: 


 10/16/18 00:37





 10/17/18 07:17


Labs: 


 Laboratory Last Values











WBC  12.2 K/mm3 (4.5-11.0)  H  10/16/18  00:37    


 


RBC  3.59 M/mm3 (3.65-5.03)  L  10/16/18  00:37    


 


Hgb  11.9 gm/dl (10.1-14.3)   10/16/18  00:37    


 


Hct  34.7 % (30.3-42.9)   10/16/18  00:37    


 


MCV  97 fl (79-97)   10/16/18  00:37    


 


MCH  33 pg (28-32)  H  10/16/18  00:37    


 


MCHC  34 % (30-34)   10/16/18  00:37    


 


RDW  14.4 % (13.2-15.2)   10/16/18  00:37    


 


Plt Count  163 K/mm3 (140-440)   10/16/18  00:37    


 


Lymph % (Auto)  18.3 % (13.4-35.0)   10/16/18  00:37    


 


Mono % (Auto)  8.2 % (0.0-7.3)  H  10/16/18  00:37    


 


Eos % (Auto)  1.4 % (0.0-4.3)   10/16/18  00:37    


 


Baso % (Auto)  0.3 % (0.0-1.8)   10/16/18  00:37    


 


Lymph #  2.2 K/mm3 (1.2-5.4)   10/16/18  00:37    


 


Mono #  1.0 K/mm3 (0.0-0.8)  H  10/16/18  00:37    


 


Eos #  0.2 K/mm3 (0.0-0.4)   10/16/18  00:37    


 


Baso #  0.0 K/mm3 (0.0-0.1)   10/16/18  00:37    


 


Seg Neutrophils %  71.8 % (40.0-70.0)  H  10/16/18  00:37    


 


Seg Neutrophils #  8.8 K/mm3 (1.8-7.7)  H  10/16/18  00:37    


 


PT  14.1 Sec. (12.2-14.9)   10/16/18  00:39    


 


INR  1.04  (0.87-1.13)   10/16/18  00:39    


 


APTT  29.2 Sec. (24.2-36.6)   10/16/18  00:39    


 


Thrombin Time  15.5 Sec. (15.1-19.6)   10/16/18  00:39    


 


D-Dimer  558.12 ng/mlDDU (0-234)  H  10/16/18  15:00    


 


Sodium  137 mmol/L (137-145)   10/17/18  07:17    


 


Potassium  4.0 mmol/L (3.6-5.0)   10/17/18  07:17    


 


Chloride  103.2 mmol/L ()   10/17/18  07:17    


 


Carbon Dioxide  24 mmol/L (22-30)   10/17/18  07:17    


 


Anion Gap  14 mmol/L  10/17/18  07:17    


 


BUN  7 mg/dL (7-17)   10/17/18  07:17    


 


Creatinine  0.5 mg/dL (0.7-1.2)  L  10/17/18  07:17    


 


Estimated GFR  > 60 ml/min  10/17/18  07:17    


 


BUN/Creatinine Ratio  14 %  10/17/18  07:17    


 


Glucose  110 mg/dL ()  H  10/17/18  07:17    


 


Calcium  8.8 mg/dL (8.4-10.2)   10/17/18  07:17    


 


Magnesium  1.70 mg/dL (1.7-2.3)   10/16/18  05:48    


 


Total Creatine Kinase  530 units/L ()  H  10/16/18  05:48    


 


CK-MB (CK-2)  5.9 ng/mL (0.0-4.0)  H  10/16/18  05:48    


 


CK-MB (CK-2) Rel Index  1.1  (0-4)   10/16/18  05:48    


 


Troponin T  0.022 ng/mL (0.00-0.029)   10/16/18  05:48    


 


Urine Opiates Screen  Presumptive negative   10/16/18  21:34    


 


Urine Methadone Screen  Presumptive negative   10/16/18  21:34    


 


Ur Barbiturates Screen  Presumptive negative   10/16/18  21:34    


 


Ur Phencyclidine Scrn  Presumptive negative   10/16/18  21:34    


 


Ur Amphetamines Screen  Presumptive negative   10/16/18  21:34    


 


U Benzodiazepines Scrn  Presumptive negative   10/16/18  21:34    


 


Urine Cocaine Screen  Presumptive negative   10/16/18  21:34    


 


U Marijuana (THC) Screen  Presumptive negative   10/16/18  21:34    


 


Drugs of Abuse Note  Disclamer   10/16/18  21:34

## 2018-10-17 NOTE — PROGRESS NOTE
Assessment and Plan


Echo reviewed - EF 20-25%, mild to mod TR, mod pulm HTN with RVSP 58mmHg, mild 

MR, pacemaker wire in RV. Initiate low dose coreg and lisinopril in setting of 

CMP. 


Attempt to obtain [facility] records. 


Interrogate AICD - we are still trying to identify which company the device 

belongs to as the pt does not know. All device companies have been called and 

none have any record of the pt with current name and . Pt denies use of any 

other name and/or . we have called [facility] and are awaiting 

return call.  


Currently stable cardiac status. Pt may tx out of ICU to telemetry from 

cardiology standpoint. 





The patient has been seen in conjunction with Dr. Olmstead who agrees with the 

assessment and plan of care. 








- Patient Problems


(1) Cardiac arrest with successful resuscitation


Current Visit: Yes   Status: Acute   





(2) History of cardiomyopathy


Current Visit: Yes   Status: Chronic   





(3) Automatic implantable cardioverter-defibrillator in situ


Current Visit: Yes   Status: Chronic   





(4) History of ETOH abuse


Current Visit: Yes   Status: Chronic   





(5) History of cocaine abuse


Current Visit: Yes   Status: Chronic   





(6) Tobacco use


Current Visit: Yes   Status: Chronic   





(7) Pneumonia


Current Visit: Yes   Status: Suspected   





(8) Hypokalemia


Current Visit: Yes   Status: Acute   





Subjective


Date of service: 10/17/18


Principal diagnosis: cardiac arrest


Interval history: 


pt resting comfortably in bed, c/o chest soreness secondary to CPR. 








Objective





  Last Vital Signs











Temp  98.3 F   10/17/18 08:00


 


Pulse  98 H  10/17/18 12:30


 


Resp  12   10/17/18 04:40


 


BP  114/77   10/17/18 12:30


 


Pulse Ox  92   10/17/18 12:30

















- Physical Examination


General: No Apparent Distress


HEENT: Positive: PERRL, Normocephaly, Mucus Membranes Moist


Neck: Positive: neck supple, trachea midline


Cardiac: Positive: Reg Rate and Rhythm, S1/S2


Lungs: Positive: clear to auscultation


Neuro: Positive: Grossly Intact


Abdomen: Positive: Soft.  Negative: Tender


Skin: Positive: Clear.  Negative: Rash, Wound


Musculoskeletal: No Pain


Extremities: Absent: edema





- Labs and Meds


 Comprehensive Metabolic Panel











  10/16/18 10/17/18 Range/Units





  15:00 07:17 


 


Sodium  137  137  (137-145)  mmol/L


 


Potassium  4.3  D  4.0  (3.6-5.0)  mmol/L


 


Chloride  101.4  103.2  ()  mmol/L


 


Carbon Dioxide  20 L  24  (22-30)  mmol/L


 


BUN  8  7  (7-17)  mg/dL


 


Creatinine  0.6 L  0.5 L  (0.7-1.2)  mg/dL


 


Glucose  120 H  110 H  ()  mg/dL


 


Calcium  9.1  8.8  (8.4-10.2)  mg/dL














- Imaging and Cardiology


EKG: report reviewed, image reviewed


Echo: pending





- EKG


Ventricular dysrhythmias: ventricular premature com


Pacemaker: atrial pacing w/capture





- Allied health notes


Allied health notes reviewed: nursing

## 2018-10-17 NOTE — PROGRESS NOTE
Assessment and Plan


Cardiac arrest with ROSC


Syncope


h/o Cardiomyopathy


h/o substance abuse disorder.


Aspiration pneumonitis








-Transfer to telemetry


-Replete electrolytes


-Keep K>4 and Magnesium at 2


-Mobility


-Cardio-protective measures


-Substance abuse counselling


-Monitor off antibiotics, suspect this is aspiration pneumonitis.


Discontinue Ceftriaxone


-prn bronchodilators 


-Supplemental oxygen as indicated for O2 sats<88%











Subjective


Date of service: 10/17/18


Interval history: 





Follow up: s/p cardiac arrest with ROSC, syncope, substance abuse disorder





Seen and examined.


Vitals, labs, medications, chart reviewed.


No acute overnight events. Consulted nursing staff


Telemetry shows a paced rhythm.


She denies any shortness of breath, no fevers or chills.


No nausea, no diarrhea. She feels much better


Ambulating in the room, has chest pain which she attributes to chest 

compression.





Objective


 Vital Signs - 12hr











  10/17/18 10/17/18 10/17/18





  00:00 00:10 00:20


 


Temperature   


 


Pulse Rate 77 75 77


 


Respiratory 11 L 12 12





Rate   


 


Blood Pressure 116/72 120/66 120/66


 


O2 Sat by Pulse 98 99 99





Oximetry   














  10/17/18 10/17/18 10/17/18





  00:30 00:40 00:50


 


Temperature   


 


Pulse Rate 78 81 76


 


Respiratory 12 12 11 L





Rate   


 


Blood Pressure 120/66 116/72 116/72


 


O2 Sat by Pulse 99 99 99





Oximetry   














  10/17/18 10/17/18 10/17/18





  01:00 01:10 01:20


 


Temperature   


 


Pulse Rate 75 80 73


 


Respiratory 12 14 12





Rate   


 


Blood Pressure 111/74 111/74 111/74


 


O2 Sat by Pulse 99 100 100





Oximetry   














  10/17/18 10/17/18 10/17/18





  01:30 01:40 01:50


 


Temperature   


 


Pulse Rate 80 77 75


 


Respiratory 11 L 13 12





Rate   


 


Blood Pressure 111/74 111/74 111/74


 


O2 Sat by Pulse 99 100 100





Oximetry   














  10/17/18 10/17/18 10/17/18





  02:00 02:10 02:20


 


Temperature   


 


Pulse Rate 74 77 81


 


Respiratory 12 12 12





Rate   


 


Blood Pressure 100/66 100/66 100/66


 


O2 Sat by Pulse 100 100 100





Oximetry   














  10/17/18 10/17/18 10/17/18





  02:30 02:40 02:50


 


Temperature   


 


Pulse Rate 81 81 75


 


Respiratory 14 12 12





Rate   


 


Blood Pressure 100/66 100/66 100/66


 


O2 Sat by Pulse 100 100 99





Oximetry   














  10/17/18 10/17/18 10/17/18





  03:00 03:10 03:20


 


Temperature   


 


Pulse Rate 79 73 72


 


Respiratory 23 14 12





Rate   


 


Blood Pressure 100/66 116/72 116/72


 


O2 Sat by Pulse 95 96 98





Oximetry   














  10/17/18 10/17/18 10/17/18





  03:30 03:40 03:50


 


Temperature   


 


Pulse Rate 72 80 73


 


Respiratory 14 14 13





Rate   


 


Blood Pressure 116/72 116/72 116/72


 


O2 Sat by Pulse 98 97 98





Oximetry   














  10/17/18 10/17/18 10/17/18





  04:00 04:10 04:20


 


Temperature   


 


Pulse Rate 73 76 75


 


Respiratory 12 13 13





Rate   


 


Blood Pressure 116/72 119/75 119/75


 


O2 Sat by Pulse 98 98 99





Oximetry   














  10/17/18 10/17/18 10/17/18





  04:30 04:40 04:50


 


Temperature   


 


Pulse Rate 79  85


 


Respiratory 15 12 





Rate   


 


Blood Pressure 119/75 119/75 119/75


 


O2 Sat by Pulse 97 97 94





Oximetry   














  10/17/18 10/17/18 10/17/18





  05:00 05:10 05:20


 


Temperature   


 


Pulse Rate 78 81 76


 


Respiratory   





Rate   


 


Blood Pressure 120/71 120/71 120/71


 


O2 Sat by Pulse 97 98 99





Oximetry   














  10/17/18 10/17/18 10/17/18





  05:30 05:40 05:50


 


Temperature   


 


Pulse Rate 78 89 77


 


Respiratory   





Rate   


 


Blood Pressure 120/71 120/71 120/71


 


O2 Sat by Pulse 100 97 98





Oximetry   














  10/17/18 10/17/18 10/17/18





  06:00 06:10 06:53


 


Temperature   


 


Pulse Rate 88 83 


 


Respiratory   





Rate   


 


Blood Pressure 120/73 120/73 


 


O2 Sat by Pulse 98 99 99





Oximetry   














  10/17/18





  08:00


 


Temperature 98.3 F


 


Pulse Rate 


 


Respiratory 





Rate 


 


Blood Pressure 


 


O2 Sat by Pulse 





Oximetry 











Constitutional: no acute distress, other


Eyes: non-icteric


ENT: oropharynx moist


Neck: supple, no lymphadenopathy


Effort: normal


Ascultation: Bilateral: clear


Cardiovascular: regular rate and rhythm, other (paced)


Gastrointestinal: normoactive bowel sounds, soft, non-tender, non-distended


Integumentary: normal


Extremities: no cyanosis, no edema, pink and warm


Neurologic: normal mental status, non-focal exam


Psychiatric: mood appropriate, affect normal


CBC and BMP: 


 10/18/18 04:33





 10/18/18 04:33


ABG, PT/INR, D-dimer: 


PT/INR, D-dimer











PT  14.1 Sec. (12.2-14.9)   10/16/18  00:39    


 


INR  1.04  (0.87-1.13)   10/16/18  00:39    


 


D-Dimer  558.12 ng/mlDDU (0-234)  H  10/16/18  15:00    








Abnormal lab findings: 


 Abnormal Labs











  10/16/18 10/16/18 10/16/18





  00:37 00:37 05:48


 


WBC  12.2 H  


 


RBC  3.59 L  


 


MCH  33 H  


 


Mono % (Auto)  8.2 H  


 


Mono #  1.0 H  


 


Seg Neutrophils %  71.8 H  


 


Seg Neutrophils #  8.8 H  


 


D-Dimer   


 


Potassium   2.8 L* 


 


Carbon Dioxide   


 


Creatinine   


 


Glucose   124 H 


 


Total Creatine Kinase    530 H


 


CK-MB (CK-2)    5.9 H














  10/16/18 10/16/18 10/17/18





  15:00 15:00 07:17


 


WBC   


 


RBC   


 


MCH   


 


Mono % (Auto)   


 


Mono #   


 


Seg Neutrophils %   


 


Seg Neutrophils #   


 


D-Dimer   558.12 H 


 


Potassium   


 


Carbon Dioxide  20 L  


 


Creatinine  0.6 L   0.5 L


 


Glucose  120 H   110 H


 


Total Creatine Kinase   


 


CK-MB (CK-2)   











Allied health notes reviewed: nursing

## 2018-10-18 NOTE — PROGRESS NOTE
Assessment and Plan


Echo findings discussed with pt. She does report a history of "weak heart" and 

states that she underwent heart cath at [facility] although she does not remember 

the results of this study. She denies any known history of CAD, AMI or PCI. 


I have requested [facility] records for the second time. 


Pt states this AM that her device is a pacemaker, not AICD. We are still trying 

to identify which company the device belongs to as the pt does not know. All 

device companies have been called and none have any record of the pt with 

current name and . Pt denies use of any other name and/or . we have 

called [facility] and are awaiting return call.  





The patient has been seen in conjunction with Dr. Olmstead who agrees with the 

assessment and plan of care. 








- Patient Problems


(1) Cardiac arrest with successful resuscitation


Current Visit: Yes   Status: Acute   





(2) History of cardiomyopathy


Current Visit: Yes   Status: Chronic   





(3) Automatic implantable cardioverter-defibrillator in situ


Current Visit: Yes   Status: Chronic   





(4) History of ETOH abuse


Current Visit: Yes   Status: Chronic   





(5) History of cocaine abuse


Current Visit: Yes   Status: Chronic   





(6) Tobacco use


Current Visit: Yes   Status: Chronic   





(7) Pneumonia


Current Visit: Yes   Status: Suspected   





(8) Hypokalemia


Current Visit: Yes   Status: Acute   





Subjective


Date of service: 10/18/18


Principal diagnosis: cardiac arrest


Interval history: 


pt resting comfortably in bed, c/o chest soreness secondary to CPR. 








Objective


 Last Vital Signs











Temp  98.8 F   10/18/18 07:31


 


Pulse  93 H  10/18/18 07:31


 


Resp  16   10/18/18 07:31


 


BP  127/84   10/18/18 07:31


 


Pulse Ox  97   10/18/18 07:31




















- Physical Examination


General: No Apparent Distress


HEENT: Positive: PERRL, Normocephaly, Mucus Membranes Moist


Neck: Positive: neck supple, trachea midline


Cardiac: Positive: Reg Rate and Rhythm, S1/S2


Lungs: Positive: clear to auscultation


Neuro: Positive: Grossly Intact


Abdomen: Positive: Soft.  Negative: Tender


Skin: Positive: Clear.  Negative: Rash, Wound


Musculoskeletal: No Pain


Extremities: Absent: edema





- Labs and Meds


 CBC











  10/18/18 Range/Units





  04:33 


 


WBC  9.0  (4.5-11.0)  K/mm3


 


RBC  3.25 L  (3.65-5.03)  M/mm3


 


Hgb  10.6  (10.1-14.3)  gm/dl


 


Hct  31.2  (30.3-42.9)  %


 


Plt Count  144  (140-440)  K/mm3


 


Lymph #  1.9  (1.2-5.4)  K/mm3


 


Mono #  0.9 H  (0.0-0.8)  K/mm3


 


Eos #  0.2  (0.0-0.4)  K/mm3


 


Baso #  0.0  (0.0-0.1)  K/mm3








 Comprehensive Metabolic Panel











  10/18/18 Range/Units





  04:33 


 


Sodium  138  (137-145)  mmol/L


 


Potassium  4.2  (3.6-5.0)  mmol/L


 


Chloride  102.8  ()  mmol/L


 


Carbon Dioxide  25  (22-30)  mmol/L


 


BUN  9  (7-17)  mg/dL


 


Creatinine  0.6 L  (0.7-1.2)  mg/dL


 


Glucose  113 H  ()  mg/dL


 


Calcium  9.2  (8.4-10.2)  mg/dL














- Imaging and Cardiology


EKG: report reviewed, image reviewed


Echo: report reviewed (EF 20-25%, mild to mod TR, mod pulm HTN with RVSP 58mmHg

, mild MR, pacemaker wire in RV.)





- Telemetry


EKG Rhythm: Paced





- EKG


Ventricular dysrhythmias: ventricular premature com


Pacemaker: atrial pacing w/capture





- Allied health notes


Allied health notes reviewed: nursing

## 2018-10-18 NOTE — PROGRESS NOTE
Assessment and Plan





Patient resting on room air. O2 saturation 98%.Patient complaining right sided 

chest pain from compressions.No acute respiratory distress.





- Patient Problems


(1) Cardiac arrest with successful resuscitation


Current Visit: Yes   Status: Acute   


Plan to address problem: 


Patient alert, awake. Resting on room air.O2 saturation 98%.








(2) TIA (transient ischemic attack)


Current Visit: Yes   Status: Acute   


Plan to address problem: 


Management as per primary care and neurology.








(3) Automatic implantable cardioverter-defibrillator in situ


Current Visit: Yes   Status: Chronic   


Plan to address problem: 


Management as per primary care.








(4) History of ETOH abuse


Current Visit: Yes   Status: Chronic   


Plan to address problem: 


Counselled to stop drinking alcohol.








(5) History of cardiomyopathy


Current Visit: Yes   Status: Chronic   


Plan to address problem: 


Management as per cardiology.








(6) History of cocaine abuse


Current Visit: Yes   Status: Chronic   


Plan to address problem: 


Counselled stop using cocain.








(7) Tobacco use


Current Visit: Yes   Status: Chronic   


Plan to address problem: 


Counselled to stop smoking.








(8) Pulmonary infiltrate in right lung on CXR


Current Visit: Yes   Status: Acute   


Plan to address problem: 


Patient afebrile


 No Leukocytosis


 Denies cough or shortness of breath.








Subjective


Date of service: 10/18/18


Principal diagnosis: cardiac arrest


Interval history: 





Patient resting on room air. O2 saturation 98%.Patient complaining right sided 

chest pain from compressions.No acute respiratory distress.





Objective


 Vital Signs - 12hr











  10/18/18 10/18/18





  04:20 07:31


 


Temperature  98.8 F


 


Pulse Rate 47 L 93 H


 


Respiratory  16





Rate  


 


Blood Pressure 108/78 127/84


 


O2 Sat by Pulse 92 97





Oximetry  











Constitutional: no acute distress, alert, other


Eyes: non-icteric


ENT: oropharynx moist


Neck: supple, no lymphadenopathy


Effort: normal


Ascultation: Bilateral: clear


Cardiovascular: regular rate and rhythm, other (paced)


Gastrointestinal: normoactive bowel sounds, soft, non-tender, non-distended


Integumentary: normal


Extremities: no cyanosis, no edema, pink and warm


Neurologic: normal mental status, non-focal exam


Psychiatric: mood appropriate, affect normal


CBC and BMP: 


 10/18/18 04:33





 10/18/18 04:33


ABG, PT/INR, D-dimer: 


PT/INR, D-dimer











PT  14.1 Sec. (12.2-14.9)   10/16/18  00:39    


 


INR  1.04  (0.87-1.13)   10/16/18  00:39    


 


D-Dimer  558.12 ng/mlDDU (0-234)  H  10/16/18  15:00    








Abnormal lab findings: 


 Abnormal Labs











  10/16/18 10/16/18 10/16/18





  00:37 00:37 05:48


 


WBC  12.2 H  


 


RBC  3.59 L  


 


MCH  33 H  


 


Mono % (Auto)  8.2 H  


 


Mono #  1.0 H  


 


Seg Neutrophils %  71.8 H  


 


Seg Neutrophils #  8.8 H  


 


D-Dimer   


 


Potassium   2.8 L* 


 


Carbon Dioxide   


 


Creatinine   


 


Glucose   124 H 


 


Total Creatine Kinase    530 H


 


CK-MB (CK-2)    5.9 H














  10/16/18 10/16/18 10/17/18





  15:00 15:00 07:17


 


WBC   


 


RBC   


 


MCH   


 


Mono % (Auto)   


 


Mono #   


 


Seg Neutrophils %   


 


Seg Neutrophils #   


 


D-Dimer   558.12 H 


 


Potassium   


 


Carbon Dioxide  20 L  


 


Creatinine  0.6 L   0.5 L


 


Glucose  120 H   110 H


 


Total Creatine Kinase   


 


CK-MB (CK-2)   














  10/18/18 10/18/18





  04:33 04:33


 


WBC  


 


RBC  3.25 L 


 


MCH  33 H 


 


Mono % (Auto)  10.4 H 


 


Mono #  0.9 H 


 


Seg Neutrophils %  


 


Seg Neutrophils #  


 


D-Dimer  


 


Potassium  


 


Carbon Dioxide  


 


Creatinine   0.6 L


 


Glucose   113 H


 


Total Creatine Kinase  


 


CK-MB (CK-2)  











Chest x-ray: report reviewed (Infiltrate right lung.), image reviewed


Allied health notes reviewed: nursing

## 2018-10-18 NOTE — PROGRESS NOTE
Assessment and Plan


Assessment and plan: 





S/p cardiopulmonary arrest.  Patient with successful resuscitation.  Continue 

per cardiology recommendations.


-Natan negative for AMI. ECG shows NAF. 


-Attempt to obtain [facility] records.


-Echocardiogram revealed left ventricular systolic function severely decreased 

with EF of 20-25%.  Right ventricular global systolic function is normal.  

Right ventricular systolic pressure is calculated at 50 mmHg with evidence of 

moderate pulmonary hypertension


-Interrogate AICD





Pulmonary hypertension.





Cardiomyopathy with AICD in situ.  As above 





History of EtOH/cocaine abuse.  Continue withdrawal protocols.





Pneumonia.  Likely aspiration secondary from cardiopulmonary arrest.  Cont. IV 

antibiotic and follow-up serial chest x-ray





Hypokalemia.  Replete potassium as needed.





History


Interval history: 





The pt is a 54 YO female with a past medical history of cardiomyopathy, ? AICD 

in situ (placed 8/2018), ETOH abuse, cocaine use, tobacco use.  She is 

regularly followed by [facility]. She does not recall the events which 

lead to her hospitalization. She works for [employer] and was found unresponsive 

after reported possible fall at work. Per EMS, bystander started CPR and 

applied AED which applied one shock. On EMS arrival, patient was without a 

pulse another shock applied per EMS. Return of spontaneous circulation achieved 

per EMS. On arrival to ED, pt was responding to commands and moving all 

extremities but aphasic.  Presently, pt is A&O with no current cardiac 

complaints. Pt denies any prior CAD, AMI or arrhythmias. She does not remember 

the events leading up to her admission. 





Hospitalist Physical





- Constitutional


Vitals: 


 











Temp Pulse Resp BP Pulse Ox


 


 98.8 F   93 H  16   127/84   97 


 


 10/18/18 07:31  10/18/18 07:31  10/18/18 07:31  10/18/18 07:31  10/18/18 07:31











General appearance: Present: no acute distress





- EENT


Eyes: Present: PERRL, EOM intact


ENT: hearing intact, clear oral mucosa, dentition normal





- Neck


Neck: Present: supple, normal ROM





- Respiratory


Respiratory effort: normal


Respiratory: bilateral: CTA





- Cardiovascular


Rhythm: regular


Heart Sounds: Present: S1 & S2.  Absent: gallop, rub





- Extremities


Extremities: no ischemia, No edema, Full ROM





- Abdominal


General gastrointestinal: soft, non-tender, non-distended, normal bowel sounds





- Integumentary


Integumentary: Present: clear, warm, dry





- Neurologic


Neurologic: CNII-XII intact, moves all extremities





Results





- Labs


CBC & Chem 7: 


 10/18/18 04:33





 10/18/18 04:33


Labs: 


 Laboratory Last Values











WBC  9.0 K/mm3 (4.5-11.0)   10/18/18  04:33    


 


RBC  3.25 M/mm3 (3.65-5.03)  L  10/18/18  04:33    


 


Hgb  10.6 gm/dl (10.1-14.3)   10/18/18  04:33    


 


Hct  31.2 % (30.3-42.9)   10/18/18  04:33    


 


MCV  96 fl (79-97)   10/18/18  04:33    


 


MCH  33 pg (28-32)  H  10/18/18  04:33    


 


MCHC  34 % (30-34)   10/18/18  04:33    


 


RDW  13.8 % (13.2-15.2)   10/18/18  04:33    


 


Plt Count  144 K/mm3 (140-440)   10/18/18  04:33    


 


Lymph % (Auto)  21.2 % (13.4-35.0)   10/18/18  04:33    


 


Mono % (Auto)  10.4 % (0.0-7.3)  H  10/18/18  04:33    


 


Eos % (Auto)  2.0 % (0.0-4.3)   10/18/18  04:33    


 


Baso % (Auto)  0.5 % (0.0-1.8)   10/18/18  04:33    


 


Lymph #  1.9 K/mm3 (1.2-5.4)   10/18/18  04:33    


 


Mono #  0.9 K/mm3 (0.0-0.8)  H  10/18/18  04:33    


 


Eos #  0.2 K/mm3 (0.0-0.4)   10/18/18  04:33    


 


Baso #  0.0 K/mm3 (0.0-0.1)   10/18/18  04:33    


 


Seg Neutrophils %  65.9 % (40.0-70.0)   10/18/18  04:33    


 


Seg Neutrophils #  5.9 K/mm3 (1.8-7.7)   10/18/18  04:33    


 


PT  14.1 Sec. (12.2-14.9)   10/16/18  00:39    


 


INR  1.04  (0.87-1.13)   10/16/18  00:39    


 


APTT  29.2 Sec. (24.2-36.6)   10/16/18  00:39    


 


Thrombin Time  15.5 Sec. (15.1-19.6)   10/16/18  00:39    


 


D-Dimer  558.12 ng/mlDDU (0-234)  H  10/16/18  15:00    


 


Sodium  138 mmol/L (137-145)   10/18/18  04:33    


 


Potassium  4.2 mmol/L (3.6-5.0)   10/18/18  04:33    


 


Chloride  102.8 mmol/L ()   10/18/18  04:33    


 


Carbon Dioxide  25 mmol/L (22-30)   10/18/18  04:33    


 


Anion Gap  14 mmol/L  10/18/18  04:33    


 


BUN  9 mg/dL (7-17)   10/18/18  04:33    


 


Creatinine  0.6 mg/dL (0.7-1.2)  L  10/18/18  04:33    


 


Estimated GFR  > 60 ml/min  10/18/18  04:33    


 


BUN/Creatinine Ratio  15 %  10/18/18  04:33    


 


Glucose  113 mg/dL ()  H  10/18/18  04:33    


 


Calcium  9.2 mg/dL (8.4-10.2)   10/18/18  04:33    


 


Magnesium  1.70 mg/dL (1.7-2.3)   10/16/18  05:48    


 


Total Creatine Kinase  530 units/L ()  H  10/16/18  05:48    


 


CK-MB (CK-2)  5.9 ng/mL (0.0-4.0)  H  10/16/18  05:48    


 


CK-MB (CK-2) Rel Index  1.1  (0-4)   10/16/18  05:48    


 


Troponin T  0.022 ng/mL (0.00-0.029)   10/16/18  05:48    


 


Urine Opiates Screen  Presumptive negative   10/16/18  21:34    


 


Urine Methadone Screen  Presumptive negative   10/16/18  21:34    


 


Ur Barbiturates Screen  Presumptive negative   10/16/18  21:34    


 


Ur Phencyclidine Scrn  Presumptive negative   10/16/18  21:34    


 


Ur Amphetamines Screen  Presumptive negative   10/16/18  21:34    


 


U Benzodiazepines Scrn  Presumptive negative   10/16/18  21:34    


 


Urine Cocaine Screen  Presumptive negative   10/16/18  21:34    


 


U Marijuana (THC) Screen  Presumptive negative   10/16/18  21:34    


 


Drugs of Abuse Note  Disclamer   10/16/18  21:34

## 2018-10-19 NOTE — PROGRESS NOTE
Assessment and Plan


[facility] records received - pt has [employer] pacemaker which was placed in 8/2018 

for complete heart block. She has a history of NICMP with EF 20% in 2017 

improved to 50% in April 2018. She did not receive AICD in 8/2018 in setting of 

normalized EF. She underwent LHC 8/2018 which showed luminal irregularities, 

long 30% proximal stenosis of left main. Echo done 8/2018 showed EF 50-55%. 

Will interrogate PPM. Also, given recent reduction in EF, will repeat ischemic 

evaluation - will plan for lexiscan MPI stress test in AM. Lastly, pt may 

require LifeVest placement pending PPM interrogation results. Assessment and 

plan reviewed with pt at bedside. 





The patient has been seen in conjunction with Dr. Olmstead who agrees with the 

assessment and plan of care. 








- Patient Problems


(1) Cardiac arrest with successful resuscitation


Current Visit: Yes   Status: Acute   





(2) History of cardiomyopathy


Current Visit: Yes   Status: Chronic   





(3) Automatic implantable cardioverter-defibrillator in situ


Current Visit: Yes   Status: Chronic   





(4) History of ETOH abuse


Current Visit: Yes   Status: Chronic   





(5) History of cocaine abuse


Current Visit: Yes   Status: Chronic   





(6) Tobacco use


Current Visit: Yes   Status: Chronic   





(7) Pneumonia


Current Visit: Yes   Status: Suspected   





(8) Hypokalemia


Current Visit: Yes   Status: Acute   





Subjective


Date of service: 10/19/18


Principal diagnosis: cardiac arrest


Interval history: 


pt resting comfortably in bed, c/o chest soreness secondary to CPR. 








Objective





  Last Vital Signs











Temp  97.9 F   10/19/18 11:14


 


Pulse  87   10/19/18 11:14


 


Resp  18   10/19/18 11:14


 


BP  115/79   10/19/18 11:14


 


Pulse Ox  100   10/19/18 11:14














- Physical Examination


General: No Apparent Distress


HEENT: Positive: PERRL, Normocephaly, Mucus Membranes Moist


Neck: Positive: neck supple, trachea midline


Cardiac: Positive: Reg Rate and Rhythm, S1/S2


Lungs: Positive: clear to auscultation


Neuro: Positive: Grossly Intact


Abdomen: Positive: Soft.  Negative: Tender


Skin: Positive: Clear.  Negative: Rash, Wound


Musculoskeletal: No Pain


Extremities: Absent: edema





- Imaging and Cardiology


EKG: report reviewed, image reviewed


Echo: report reviewed (EF 20-25%, mild to mod TR, mod pulm HTN with RVSP 58mmHg

, mild MR, pacemaker wire in RV.)





- Telemetry


EKG Rhythm: Sinus Rhythm





- EKG


Ventricular dysrhythmias: ventricular premature com


Pacemaker: atrial pacing w/capture





- Allied health notes


Allied health notes reviewed: nursing

## 2018-10-19 NOTE — EVENT NOTE
Date: 10/19/18


PPM interrogation reveals 5min 30sec run of ventricular fibrillation which 

corresponds to pt's syncopal episode/cardiac arrest in the field. Cancel stress 

test. Pt may require coronary angiogram. Additionally, pt may require upgrade 

of PPM to AICD. Jason d/w TOMAS MAYS NP / DR. REYES

## 2018-10-19 NOTE — PROGRESS NOTE
Assessment and Plan


Assessment and plan: 





S/p cardiopulmonary arrest.  Patient with successful resuscitation.  Continue 

per cardiology recommendations.


-Natan negative for AMI. ECG shows NAF. 


-Attempt to obtain [facility] records.


-Echocardiogram revealed left ventricular systolic function severely decreased 

with EF of 20-25%.  Right ventricular global systolic function is normal.  

Right ventricular systolic pressure is calculated at 50 mmHg with evidence of 

moderate pulmonary hypertension


-Interrogate AICD





Pulmonary hypertension.





Cardiomyopathy with AICD in situ.  As above 





History of EtOH/cocaine abuse.  Continue withdrawal protocols.





Pneumonia.  Likely aspiration secondary from cardiopulmonary arrest.  Cont. IV 

antibiotic and follow-up serial chest x-ray





Hypokalemia.  Replete potassium as needed.





History


Interval history: 





The pt is a 54 YO female with a past medical history of cardiomyopathy, ? AICD 

in situ (placed 8/2018), ETOH abuse, cocaine use, tobacco use.  She is 

regularly followed by [facility] [facility]. She does not recall the events which 

lead to her hospitalization. She works for [employer] and was found unresponsive 

after reported possible fall at work. Per EMS, bystander started CPR and 

applied AED which applied one shock. On EMS arrival, patient was without a 

pulse another shock applied per EMS. Return of spontaneous circulation achieved 

per EMS. On arrival to ED, pt was responding to commands and moving all 

extremities but aphasic.  Presently, pt is A&O with no current cardiac 

complaints. Pt denies any prior CAD, AMI or arrhythmias. She does not remember 

the events leading up to her admission. 





Hospitalist Physical





- Constitutional


Vitals: 


 











Temp Pulse Resp BP Pulse Ox


 


 98.7 F   93 H  18   120/80   97 


 


 10/19/18 07:43  10/19/18 07:43  10/19/18 07:43  10/19/18 07:43  10/19/18 08:37











General appearance: Present: no acute distress





- EENT


Eyes: Present: PERRL, EOM intact


ENT: hearing intact, clear oral mucosa, dentition normal





- Neck


Neck: Present: supple, normal ROM





- Respiratory


Respiratory effort: normal


Respiratory: bilateral: CTA





- Cardiovascular


Rhythm: regular


Heart Sounds: Present: S1 & S2.  Absent: gallop, rub





- Extremities


Extremities: no ischemia, No edema, Full ROM





- Abdominal


General gastrointestinal: soft, non-tender, non-distended, normal bowel sounds





- Integumentary


Integumentary: Present: clear, warm, dry





- Neurologic


Neurologic: CNII-XII intact, moves all extremities





Results





- Labs


CBC & Chem 7: 


 10/18/18 04:33





 10/18/18 04:33


Labs: 


 Laboratory Last Values











WBC  9.0 K/mm3 (4.5-11.0)   10/18/18  04:33    


 


RBC  3.25 M/mm3 (3.65-5.03)  L  10/18/18  04:33    


 


Hgb  10.6 gm/dl (10.1-14.3)   10/18/18  04:33    


 


Hct  31.2 % (30.3-42.9)   10/18/18  04:33    


 


MCV  96 fl (79-97)   10/18/18  04:33    


 


MCH  33 pg (28-32)  H  10/18/18  04:33    


 


MCHC  34 % (30-34)   10/18/18  04:33    


 


RDW  13.8 % (13.2-15.2)   10/18/18  04:33    


 


Plt Count  144 K/mm3 (140-440)   10/18/18  04:33    


 


Lymph % (Auto)  21.2 % (13.4-35.0)   10/18/18  04:33    


 


Mono % (Auto)  10.4 % (0.0-7.3)  H  10/18/18  04:33    


 


Eos % (Auto)  2.0 % (0.0-4.3)   10/18/18  04:33    


 


Baso % (Auto)  0.5 % (0.0-1.8)   10/18/18  04:33    


 


Lymph #  1.9 K/mm3 (1.2-5.4)   10/18/18  04:33    


 


Mono #  0.9 K/mm3 (0.0-0.8)  H  10/18/18  04:33    


 


Eos #  0.2 K/mm3 (0.0-0.4)   10/18/18  04:33    


 


Baso #  0.0 K/mm3 (0.0-0.1)   10/18/18  04:33    


 


Seg Neutrophils %  65.9 % (40.0-70.0)   10/18/18  04:33    


 


Seg Neutrophils #  5.9 K/mm3 (1.8-7.7)   10/18/18  04:33    


 


PT  14.1 Sec. (12.2-14.9)   10/16/18  00:39    


 


INR  1.04  (0.87-1.13)   10/16/18  00:39    


 


APTT  29.2 Sec. (24.2-36.6)   10/16/18  00:39    


 


Thrombin Time  15.5 Sec. (15.1-19.6)   10/16/18  00:39    


 


D-Dimer  558.12 ng/mlDDU (0-234)  H  10/16/18  15:00    


 


Sodium  138 mmol/L (137-145)   10/18/18  04:33    


 


Potassium  4.2 mmol/L (3.6-5.0)   10/18/18  04:33    


 


Chloride  102.8 mmol/L ()   10/18/18  04:33    


 


Carbon Dioxide  25 mmol/L (22-30)   10/18/18  04:33    


 


Anion Gap  14 mmol/L  10/18/18  04:33    


 


BUN  9 mg/dL (7-17)   10/18/18  04:33    


 


Creatinine  0.6 mg/dL (0.7-1.2)  L  10/18/18  04:33    


 


Estimated GFR  > 60 ml/min  10/18/18  04:33    


 


BUN/Creatinine Ratio  15 %  10/18/18  04:33    


 


Glucose  113 mg/dL ()  H  10/18/18  04:33    


 


Calcium  9.2 mg/dL (8.4-10.2)   10/18/18  04:33    


 


Magnesium  1.70 mg/dL (1.7-2.3)   10/16/18  05:48    


 


Total Creatine Kinase  530 units/L ()  H  10/16/18  05:48    


 


CK-MB (CK-2)  5.9 ng/mL (0.0-4.0)  H  10/16/18  05:48    


 


CK-MB (CK-2) Rel Index  1.1  (0-4)   10/16/18  05:48    


 


Troponin T  0.022 ng/mL (0.00-0.029)   10/16/18  05:48    


 


Urine Opiates Screen  Presumptive negative   10/16/18  21:34    


 


Urine Methadone Screen  Presumptive negative   10/16/18  21:34    


 


Ur Barbiturates Screen  Presumptive negative   10/16/18  21:34    


 


Ur Phencyclidine Scrn  Presumptive negative   10/16/18  21:34    


 


Ur Amphetamines Screen  Presumptive negative   10/16/18  21:34    


 


U Benzodiazepines Scrn  Presumptive negative   10/16/18  21:34    


 


Urine Cocaine Screen  Presumptive negative   10/16/18  21:34    


 


U Marijuana (THC) Screen  Presumptive negative   10/16/18  21:34    


 


Drugs of Abuse Note  Disclamer   10/16/18  21:34

## 2018-10-19 NOTE — PROGRESS NOTE
Assessment and Plan


Patient resting on room air. O2 saturation 100%.Patient complaining right sided 

chest pain from compressions.No acute respiratory distress.Patient having 

episodes of ventricular fibrillation. Cardiology following.





- Patient Problems


(1) Cardiac arrest with successful resuscitation


Current Visit: Yes   Status: Acute   


Plan to address problem: 


Patient alert, awake. Resting on room air.O2 saturation 100%.








(2) TIA (transient ischemic attack)


Current Visit: Yes   Status: Acute   


Plan to address problem: 


Management as per primary care and neurology.








(3) Automatic implantable cardioverter-defibrillator in situ


Current Visit: Yes   Status: Chronic   


Plan to address problem: 


Management as per primary care.








(4) History of ETOH abuse


Current Visit: Yes   Status: Chronic   


Plan to address problem: 


Counselled to stop drinking alcohol.








(5) History of cardiomyopathy


Current Visit: Yes   Status: Chronic   


Plan to address problem: 


Management as per cardiology.








(6) History of cocaine abuse


Current Visit: Yes   Status: Chronic   


Plan to address problem: 


Counselled stop using cocain.








(7) Tobacco use


Current Visit: Yes   Status: Chronic   


Plan to address problem: 


Counselled to stop smoking.








(8) Pulmonary infiltrate in right lung on CXR


Current Visit: Yes   Status: Acute   


Plan to address problem: 


Patient afebrile


 No Leukocytosis


 Denies cough or shortness of breath.








Subjective


Date of service: 10/19/18


Principal diagnosis: cardiac arrest


Interval history: 





Patient resting on room air. O2 saturation 100%.Patient complaining right sided 

chest pain from compressions.No acute respiratory distress.Patient having 

episodes of ventricular fibrillation. Cardiology following.





Objective


 Vital Signs - 12hr











  10/19/18 10/19/18 10/19/18





  07:39 07:43 08:37


 


Temperature 97.8 F 98.7 F 


 


Pulse Rate 93 H 93 H 


 


Respiratory 18 18 





Rate   


 


Blood Pressure 120/80 120/80 





[Left]   


 


O2 Sat by Pulse 100 100 97





Oximetry   














  10/19/18





  11:14


 


Temperature 97.9 F


 


Pulse Rate 87


 


Respiratory 18





Rate 


 


Blood Pressure 115/79





[Left] 


 


O2 Sat by Pulse 100





Oximetry 











Constitutional: no acute distress, alert, other


Eyes: non-icteric


ENT: oropharynx moist


Neck: supple, no lymphadenopathy


Effort: normal


Ascultation: Bilateral: clear


Cardiovascular: regular rate and rhythm, other (paced)


Gastrointestinal: normoactive bowel sounds, soft, non-tender, non-distended


Integumentary: normal


Extremities: no cyanosis, no edema, pink and warm


Neurologic: normal mental status, non-focal exam


Psychiatric: mood appropriate, affect normal


CBC and BMP: 


 10/18/18 04:33





 10/18/18 04:33


ABG, PT/INR, D-dimer: 


PT/INR, D-dimer











PT  14.1 Sec. (12.2-14.9)   10/16/18  00:39    


 


INR  1.04  (0.87-1.13)   10/16/18  00:39    


 


D-Dimer  558.12 ng/mlDDU (0-234)  H  10/16/18  15:00    








Abnormal lab findings: 


 Abnormal Labs











  10/16/18 10/16/18 10/16/18





  00:37 00:37 05:48


 


WBC  12.2 H  


 


RBC  3.59 L  


 


MCH  33 H  


 


Mono % (Auto)  8.2 H  


 


Mono #  1.0 H  


 


Seg Neutrophils %  71.8 H  


 


Seg Neutrophils #  8.8 H  


 


D-Dimer   


 


Potassium   2.8 L* 


 


Carbon Dioxide   


 


Creatinine   


 


Glucose   124 H 


 


Total Creatine Kinase    530 H


 


CK-MB (CK-2)    5.9 H














  10/16/18 10/16/18 10/17/18





  15:00 15:00 07:17


 


WBC   


 


RBC   


 


MCH   


 


Mono % (Auto)   


 


Mono #   


 


Seg Neutrophils %   


 


Seg Neutrophils #   


 


D-Dimer   558.12 H 


 


Potassium   


 


Carbon Dioxide  20 L  


 


Creatinine  0.6 L   0.5 L


 


Glucose  120 H   110 H


 


Total Creatine Kinase   


 


CK-MB (CK-2)   














  10/18/18 10/18/18





  04:33 04:33


 


WBC  


 


RBC  3.25 L 


 


MCH  33 H 


 


Mono % (Auto)  10.4 H 


 


Mono #  0.9 H 


 


Seg Neutrophils %  


 


Seg Neutrophils #  


 


D-Dimer  


 


Potassium  


 


Carbon Dioxide  


 


Creatinine   0.6 L


 


Glucose   113 H


 


Total Creatine Kinase  


 


CK-MB (CK-2)  











Allied health notes reviewed: nursing

## 2018-10-20 NOTE — PROGRESS NOTE
Assessment and Plan





cardiac arrest


history of ppm for CHB


cardiomyopathy


acute on chronic shf





rec: reviewed records from [place] in which patient has a history of 

cardiomyopathy but was in complete heart block and echocardiogram that revealed 

normal LV function and a permanent pacemaker placed repeat echocardiogram after 

cardiac arrest reveals EF 20 to 25% patient will repeat coronary angiogram on 

Monday and possible different replacement on Tuesday if coronaries are normal 

as well as low dose digoxin for better heart rate control patient's blood 

pressure is low and is on low-dose Coreg and lisinopril





Subjective


Date of service: 10/20/18


Principal diagnosis: cardiac arrest


Interval history: 





pt has no complaints





Objective


 Vital Signs











  Temp Pulse Resp BP BP Pulse Ox


 


 10/20/18 07:57  97.7 F  78  18   119/76  100


 


 10/20/18 04:11  98.5 F  89  20  124/82   96


 


 10/20/18 00:04  97.8 F  79  20  126/88   99


 


 10/19/18 20:00    20   


 


 10/19/18 19:36  97.8 F  83  20  106/76   96


 


 10/19/18 18:58  98.0 F  94 H  18   120/80  98


 


 10/19/18 11:14  97.9 F  87  18   115/79  100


 


 10/19/18 10:00   72    














- Physical Examination


General: No Apparent Distress


HEENT: Positive: PERRL, Normocephaly, Mucus Membranes Moist


Neck: Positive: neck supple, trachea midline


Cardiac: Positive: Reg Rate and Rhythm


Lungs: Positive: clear to auscultation


Neuro: Positive: Grossly Intact


Abdomen: Positive: Soft.  Negative: Tender


Skin: Positive: Clear.  Negative: Rash, Wound


Musculoskeletal: No Pain


Extremities: Absent: edema





- Imaging and Cardiology


EKG: report reviewed, image reviewed


Echo: report reviewed (EF 20-25%, mild to mod TR, mod pulm HTN with RVSP 58mmHg

, mild MR, pacemaker wire in RV.)


Cardiac cath: report reviewed (2017 patent coronaries and ef 25%, )





- Telemetry


EKG Rhythm: Paced





- EKG


Ventricular dysrhythmias: ventricular premature com


Pacemaker: atrial pacing w/capture





- Allied health notes


Allied health notes reviewed: nursing

## 2018-10-20 NOTE — PROGRESS NOTE
Assessment and Plan


Assessment and plan: 





S/p V-fib arrest.  Patient with successful resuscitation.  Continue per 

cardiology recommendations.


-Natan negative for AMI. ECG shows NAF. 


-Echocardiogram revealed left ventricular systolic function severely decreased 

with EF of 20-25%.  Right ventricular global systolic function is normal.  

Right ventricular systolic pressure is calculated at 50 mmHg with evidence of 

moderate pulmonary hypertension


-PPM interrogation reveals 5min 30sec run of ventricular fibrillation which 

corresponds to pt's syncopal episode/cardiac arrest in the field. 


-Coronary angiogram on Monday and possible AICD replacement on Tuesday if 

coronaries are normal 


-Cards started low dose digoxin for better heart rate control patient's blood 

pressure is low and is on low-dose Coreg and lisinopril





Pulmonary hypertension.





Cardiomyopathy.  As above 





History of EtOH/cocaine abuse.  Continue withdrawal protocols.





Pneumonia.  Likely aspiration secondary from cardiopulmonary arrest.  Cont. IV 

antibiotic and follow-up serial chest x-ray





Hypokalemia.  Replete potassium as needed.





History


Interval history: 





The pt is a 52 YO female with a past medical history of cardiomyopathy, ? AICD 

in situ (placed 8/2018), ETOH abuse, cocaine use, tobacco use.  She is 

regularly followed by [facility]ology. She does not recall the events which 

lead to her hospitalization. She works for [employer] and was found unresponsive 

after reported possible fall at work. Per EMS, bystander started CPR and 

applied AED which applied one shock. On EMS arrival, patient was without a 

pulse another shock applied per EMS. Return of spontaneous circulation achieved 

per EMS. On arrival to ED, pt was responding to commands and moving all 

extremities but aphasic.  Presently, pt is A&O with no current cardiac 

complaints. Pt denies any prior CAD, AMI or arrhythmias. She does not remember 

the events leading up to her admission. 





Hospitalist Physical





- Constitutional


Vitals: 


 











Temp Pulse Resp BP Pulse Ox


 


 98.3 F   74   20   119/78   99 


 


 10/20/18 11:45  10/20/18 11:45  10/20/18 11:45  10/20/18 11:45  10/20/18 11:45











General appearance: Present: no acute distress





- EENT


Eyes: Present: PERRL, EOM intact


ENT: hearing intact, clear oral mucosa, dentition normal





- Neck


Neck: Present: supple, normal ROM





- Respiratory


Respiratory effort: normal


Respiratory: bilateral: CTA





- Cardiovascular


Rhythm: regular


Heart Sounds: Present: S1 & S2.  Absent: gallop, rub





- Extremities


Extremities: no ischemia, No edema, Full ROM





- Abdominal


General gastrointestinal: soft, non-tender, non-distended, normal bowel sounds





- Integumentary


Integumentary: Present: clear, warm, dry





- Neurologic


Neurologic: CNII-XII intact, moves all extremities





Results





- Labs


CBC & Chem 7: 


 10/18/18 04:33





 10/18/18 04:33


Labs: 


 Laboratory Last Values











WBC  9.0 K/mm3 (4.5-11.0)   10/18/18  04:33    


 


RBC  3.25 M/mm3 (3.65-5.03)  L  10/18/18  04:33    


 


Hgb  10.6 gm/dl (10.1-14.3)   10/18/18  04:33    


 


Hct  31.2 % (30.3-42.9)   10/18/18  04:33    


 


MCV  96 fl (79-97)   10/18/18  04:33    


 


MCH  33 pg (28-32)  H  10/18/18  04:33    


 


MCHC  34 % (30-34)   10/18/18  04:33    


 


RDW  13.8 % (13.2-15.2)   10/18/18  04:33    


 


Plt Count  144 K/mm3 (140-440)   10/18/18  04:33    


 


Lymph % (Auto)  21.2 % (13.4-35.0)   10/18/18  04:33    


 


Mono % (Auto)  10.4 % (0.0-7.3)  H  10/18/18  04:33    


 


Eos % (Auto)  2.0 % (0.0-4.3)   10/18/18  04:33    


 


Baso % (Auto)  0.5 % (0.0-1.8)   10/18/18  04:33    


 


Lymph #  1.9 K/mm3 (1.2-5.4)   10/18/18  04:33    


 


Mono #  0.9 K/mm3 (0.0-0.8)  H  10/18/18  04:33    


 


Eos #  0.2 K/mm3 (0.0-0.4)   10/18/18  04:33    


 


Baso #  0.0 K/mm3 (0.0-0.1)   10/18/18  04:33    


 


Seg Neutrophils %  65.9 % (40.0-70.0)   10/18/18  04:33    


 


Seg Neutrophils #  5.9 K/mm3 (1.8-7.7)   10/18/18  04:33    


 


PT  14.1 Sec. (12.2-14.9)   10/16/18  00:39    


 


INR  1.04  (0.87-1.13)   10/16/18  00:39    


 


APTT  29.2 Sec. (24.2-36.6)   10/16/18  00:39    


 


Thrombin Time  15.5 Sec. (15.1-19.6)   10/16/18  00:39    


 


D-Dimer  558.12 ng/mlDDU (0-234)  H  10/16/18  15:00    


 


Sodium  138 mmol/L (137-145)   10/18/18  04:33    


 


Potassium  4.2 mmol/L (3.6-5.0)   10/18/18  04:33    


 


Chloride  102.8 mmol/L ()   10/18/18  04:33    


 


Carbon Dioxide  25 mmol/L (22-30)   10/18/18  04:33    


 


Anion Gap  14 mmol/L  10/18/18  04:33    


 


BUN  9 mg/dL (7-17)   10/18/18  04:33    


 


Creatinine  0.6 mg/dL (0.7-1.2)  L  10/18/18  04:33    


 


Estimated GFR  > 60 ml/min  10/18/18  04:33    


 


BUN/Creatinine Ratio  15 %  10/18/18  04:33    


 


Glucose  113 mg/dL ()  H  10/18/18  04:33    


 


Calcium  9.2 mg/dL (8.4-10.2)   10/18/18  04:33    


 


Magnesium  1.70 mg/dL (1.7-2.3)   10/16/18  05:48    


 


Total Creatine Kinase  530 units/L ()  H  10/16/18  05:48    


 


CK-MB (CK-2)  5.9 ng/mL (0.0-4.0)  H  10/16/18  05:48    


 


CK-MB (CK-2) Rel Index  1.1  (0-4)   10/16/18  05:48    


 


Troponin T  0.022 ng/mL (0.00-0.029)   10/16/18  05:48    


 


TSH  2.910 mlU/mL (0.270-4.200)   10/19/18  17:13    


 


Free T4  1.24 ng/dL (0.76-1.46)   10/19/18  17:13    


 


Urine Opiates Screen  Presumptive negative   10/16/18  21:34    


 


Urine Methadone Screen  Presumptive negative   10/16/18  21:34    


 


Ur Barbiturates Screen  Presumptive negative   10/16/18  21:34    


 


Ur Phencyclidine Scrn  Presumptive negative   10/16/18  21:34    


 


Ur Amphetamines Screen  Presumptive negative   10/16/18  21:34    


 


U Benzodiazepines Scrn  Presumptive negative   10/16/18  21:34    


 


Urine Cocaine Screen  Presumptive negative   10/16/18  21:34    


 


U Marijuana (THC) Screen  Presumptive negative   10/16/18  21:34    


 


Drugs of Abuse Note  Disclamer   10/16/18  21:34

## 2018-10-21 NOTE — PROGRESS NOTE
Assessment and Plan


Assessment and plan: 





S/p V-fib arrest.  Patient with successful resuscitation.  Continue per 

cardiology recommendations.


-Natan negative for AMI. ECG shows NAF. 


-Echocardiogram revealed left ventricular systolic function severely decreased 

with EF of 20-25%.  Right ventricular global systolic function is normal.  

Right ventricular systolic pressure is calculated at 50 mmHg with evidence of 

moderate pulmonary hypertension


-PPM interrogation reveals 5min 30sec run of ventricular fibrillation which 

corresponds to pt's syncopal episode/cardiac arrest in the field. 


-Coronary angiogram on Monday and possible AICD replacement on Tuesday if 

coronaries are normal 


-Cards started low dose digoxin for better heart rate control patient's blood 

pressure is low and is on low-dose Coreg and lisinopril





Pulmonary hypertension.





Cardiomyopathy.  As above 





History of EtOH/cocaine abuse.  Continue withdrawal protocols.





Pneumonia.  Likely aspiration secondary from cardiopulmonary arrest.  Cont. IV 

antibiotic and follow-up serial chest x-ray





Hypokalemia.  Replete potassium as needed.





History


Interval history: 





The pt is a 52 YO female with a past medical history of cardiomyopathy, ? AICD 

in situ (placed 8/2018), ETOH abuse, cocaine use, tobacco use.  She is 

regularly followed by [facility]. She does not recall the events which 

lead to her hospitalization. She works for [employer] and was found unresponsive 

after reported possible fall at work. Per EMS, bystander started CPR and 

applied AED which applied one shock. On EMS arrival, patient was without a 

pulse another shock applied per EMS. Return of spontaneous circulation achieved 

per EMS. On arrival to ED, pt was responding to commands and moving all 

extremities but aphasic.  Presently, pt is A&O with no current cardiac 

complaints. Pt denies any prior CAD, AMI or arrhythmias. She does not remember 

the events leading up to her admission. 





Hospitalist Physical





- Constitutional


Vitals: 


 











Temp Pulse Resp BP Pulse Ox


 


 97.5 F L  84   16   115/79   99 


 


 10/21/18 09:03  10/21/18 10:34  10/21/18 09:03  10/21/18 10:34  10/21/18 09:03











General appearance: Present: no acute distress





- EENT


Eyes: Present: PERRL, EOM intact


ENT: hearing intact, clear oral mucosa, dentition normal





- Neck


Neck: Present: supple, normal ROM





- Respiratory


Respiratory effort: normal


Respiratory: bilateral: CTA





- Cardiovascular


Rhythm: regular


Heart Sounds: Present: S1 & S2.  Absent: gallop, rub





- Extremities


Extremities: no ischemia, No edema, Full ROM





- Abdominal


General gastrointestinal: soft, non-tender, non-distended, normal bowel sounds





- Integumentary


Integumentary: Present: clear, warm, dry





- Neurologic


Neurologic: CNII-XII intact, moves all extremities





Results





- Labs


CBC & Chem 7: 


 10/18/18 04:33





 10/18/18 04:33


Labs: 


 Laboratory Last Values











WBC  9.0 K/mm3 (4.5-11.0)   10/18/18  04:33    


 


RBC  3.25 M/mm3 (3.65-5.03)  L  10/18/18  04:33    


 


Hgb  10.6 gm/dl (10.1-14.3)   10/18/18  04:33    


 


Hct  31.2 % (30.3-42.9)   10/18/18  04:33    


 


MCV  96 fl (79-97)   10/18/18  04:33    


 


MCH  33 pg (28-32)  H  10/18/18  04:33    


 


MCHC  34 % (30-34)   10/18/18  04:33    


 


RDW  13.8 % (13.2-15.2)   10/18/18  04:33    


 


Plt Count  144 K/mm3 (140-440)   10/18/18  04:33    


 


Lymph % (Auto)  21.2 % (13.4-35.0)   10/18/18  04:33    


 


Mono % (Auto)  10.4 % (0.0-7.3)  H  10/18/18  04:33    


 


Eos % (Auto)  2.0 % (0.0-4.3)   10/18/18  04:33    


 


Baso % (Auto)  0.5 % (0.0-1.8)   10/18/18  04:33    


 


Lymph #  1.9 K/mm3 (1.2-5.4)   10/18/18  04:33    


 


Mono #  0.9 K/mm3 (0.0-0.8)  H  10/18/18  04:33    


 


Eos #  0.2 K/mm3 (0.0-0.4)   10/18/18  04:33    


 


Baso #  0.0 K/mm3 (0.0-0.1)   10/18/18  04:33    


 


Seg Neutrophils %  65.9 % (40.0-70.0)   10/18/18  04:33    


 


Seg Neutrophils #  5.9 K/mm3 (1.8-7.7)   10/18/18  04:33    


 


PT  14.1 Sec. (12.2-14.9)   10/16/18  00:39    


 


INR  1.04  (0.87-1.13)   10/16/18  00:39    


 


APTT  29.2 Sec. (24.2-36.6)   10/16/18  00:39    


 


Thrombin Time  15.5 Sec. (15.1-19.6)   10/16/18  00:39    


 


D-Dimer  558.12 ng/mlDDU (0-234)  H  10/16/18  15:00    


 


Sodium  138 mmol/L (137-145)   10/18/18  04:33    


 


Potassium  4.2 mmol/L (3.6-5.0)   10/18/18  04:33    


 


Chloride  102.8 mmol/L ()   10/18/18  04:33    


 


Carbon Dioxide  25 mmol/L (22-30)   10/18/18  04:33    


 


Anion Gap  14 mmol/L  10/18/18  04:33    


 


BUN  9 mg/dL (7-17)   10/18/18  04:33    


 


Creatinine  0.6 mg/dL (0.7-1.2)  L  10/18/18  04:33    


 


Estimated GFR  > 60 ml/min  10/18/18  04:33    


 


BUN/Creatinine Ratio  15 %  10/18/18  04:33    


 


Glucose  113 mg/dL ()  H  10/18/18  04:33    


 


Calcium  9.2 mg/dL (8.4-10.2)   10/18/18  04:33    


 


Magnesium  1.70 mg/dL (1.7-2.3)   10/16/18  05:48    


 


Total Creatine Kinase  530 units/L ()  H  10/16/18  05:48    


 


CK-MB (CK-2)  5.9 ng/mL (0.0-4.0)  H  10/16/18  05:48    


 


CK-MB (CK-2) Rel Index  1.1  (0-4)   10/16/18  05:48    


 


Troponin T  0.022 ng/mL (0.00-0.029)   10/16/18  05:48    


 


TSH  2.910 mlU/mL (0.270-4.200)   10/19/18  17:13    


 


Free T4  1.24 ng/dL (0.76-1.46)   10/19/18  17:13    


 


Urine Opiates Screen  Presumptive negative   10/16/18  21:34    


 


Urine Methadone Screen  Presumptive negative   10/16/18  21:34    


 


Ur Barbiturates Screen  Presumptive negative   10/16/18  21:34    


 


Ur Phencyclidine Scrn  Presumptive negative   10/16/18  21:34    


 


Ur Amphetamines Screen  Presumptive negative   10/16/18  21:34    


 


U Benzodiazepines Scrn  Presumptive negative   10/16/18  21:34    


 


Urine Cocaine Screen  Presumptive negative   10/16/18  21:34    


 


U Marijuana (THC) Screen  Presumptive negative   10/16/18  21:34    


 


Drugs of Abuse Note  Disclamer   10/16/18  21:34

## 2018-10-21 NOTE — PROGRESS NOTE
Assessment and Plan





cardiac arrest


history of ppm for CHB


cardiomyopathy


acute on chronic shf





rec: reviewed records from [place] in which patient has a history of 

cardiomyopathy but was in complete heart block and echocardiogram that revealed 

normal LV function and a permanent pacemaker placed repeat echocardiogram after 

cardiac arrest reveals EF 20 to 25% patient will repeat coronary angiogram on 

Monday and possible different replacement on Tuesday if coronaries are normal 

as well as low dose digoxin for better heart rate control patient's blood 

pressure is low and is on low-dose Coreg and lisinopril





Subjective


Date of service: 10/21/18


Principal diagnosis: cardiac arrest


Interval history: 


pt has no palpations and no sob 








Objective


 Vital Signs











  Temp Pulse Pulse Resp Resp BP BP


 


 10/21/18 10:00   76     


 


 10/21/18 09:15   84     115/79 


 


 10/21/18 09:03  97.5 F L  84   16   115/79 


 


 10/21/18 04:49  97.8 F  37 L   20   117/75 


 


 10/21/18 01:04     17   


 


 10/21/18 00:46  98.1 F  71   20   120/86 


 


 10/21/18 00:34     17   


 


 10/20/18 22:00    90  18   


 


 10/20/18 21:50   81     125/87 


 


 10/20/18 20:31  98.4 F  81   20   125/87 


 


 10/20/18 19:57   94 H     


 


 10/20/18 19:50      17  


 


 10/20/18 17:12  98.5 F  71   18    121/64


 


 10/20/18 17:02   72     


 


 10/20/18 11:45  98.3 F  74   20    119/78














  Pulse Ox


 


 10/21/18 10:00 


 


 10/21/18 09:15 


 


 10/21/18 09:03  99


 


 10/21/18 04:49  96


 


 10/21/18 01:04 


 


 10/21/18 00:46  97


 


 10/21/18 00:34 


 


 10/20/18 22:00 


 


 10/20/18 21:50 


 


 10/20/18 20:31  98


 


 10/20/18 19:57 


 


 10/20/18 19:50 


 


 10/20/18 17:12  98


 


 10/20/18 17:02 


 


 10/20/18 11:45  99














- Physical Examination


General: No Apparent Distress


HEENT: Positive: PERRL, Normocephaly, Mucus Membranes Moist


Neck: Positive: neck supple, trachea midline


Cardiac: Positive: Reg Rate and Rhythm


Lungs: Positive: clear to auscultation


Neuro: Positive: Grossly Intact


Abdomen: Positive: Soft.  Negative: Tender


Skin: Positive: Clear.  Negative: Rash, Wound


Musculoskeletal: No Pain


Extremities: Absent: edema





- Imaging and Cardiology


EKG: report reviewed, image reviewed


Echo: report reviewed (EF 20-25%, mild to mod TR, mod pulm HTN with RVSP 58mmHg

, mild MR, pacemaker wire in RV.)


Cardiac cath: report reviewed (2017 patent coronaries and ef 25%, )





- Telemetry


EKG Rhythm: Paced





- EKG


Ventricular dysrhythmias: ventricular premature com


Pacemaker: atrial pacing w/capture





- Allied health notes


Allied health notes reviewed: nursing

## 2018-10-22 NOTE — PROGRESS NOTE
Assessment and Plan


Assessment and plan: 





S/p V-fib arrest.  Patient with successful resuscitation.  Continue per 

cardiology recommendations.


-S/p LHC this AM which showed mild CAD with some calcification in the prox part 

of the circ, mod dilated LV, EF 25-50%. 


Plan for PPM upgrade to AICD tomorrow.


-Echocardiogram revealed left ventricular systolic function severely decreased 

with EF of 20-25%.  Right ventricular global systolic function is normal.  

Right ventricular systolic pressure is calculated at 50 mmHg with evidence of 

moderate pulmonary hypertension


-PPM interrogation revealed 5min 30sec run of ventricular fibrillation which 

corresponds to pt's syncopal episode/cardiac arrest in the field. 


-Cards started low dose digoxin for better heart rate control patient's blood 

pressure is low and is on low-dose Coreg and lisinopril





Pulmonary hypertension.





Cardiomyopathy.  As above 





History of EtOH/cocaine abuse.  Continue withdrawal protocols.  Patient has 

been counseled on cessation.





Aspiration Pneumonia.  Resolved.  Likely aspiration secondary from 

cardiopulmonary arrest.  Completed course of antibiotics.





Hypokalemia.  Replete potassium as needed.





History


Interval history: 





The pt is a 52 YO female with a past medical history of cardiomyopathy, ? AICD 

in situ (placed 8/2018), ETOH abuse, cocaine use, tobacco use.  She is 

regularly followed by [facility]. She does not recall the events which 

lead to her hospitalization. She works for [employer] and was found unresponsive 

after reported possible fall at work. Per EMS, bystander started CPR and 

applied AED which applied one shock. On EMS arrival, patient was without a 

pulse another shock applied per EMS. Return of spontaneous circulation achieved 

per EMS. On arrival to ED, pt was responding to commands and moving all 

extremities but aphasic.  Presently, pt is A&O with no current cardiac 

complaints. Pt denies any prior CAD, AMI or arrhythmias. She does not remember 

the events leading up to her admission.  PPD M interrogation revealed 5min 

30sec run of ventricular fibrillation which corresponds to pt's syncopal episode

/cardiac arrest in the field.  The patient underwent LHC this AM which showed 

mild CAD with some calcification in the prox part of the circ, mod dilated LV, 

EF 25-50%. 





Hospitalist Physical





- Constitutional


Vitals: 


 











Temp Pulse Resp BP Pulse Ox


 


 98.1 F   73   17   109/70   100 


 


 10/22/18 11:15  10/22/18 11:29  10/22/18 11:15  10/22/18 11:15  10/22/18 11:15











General appearance: Present: no acute distress





- EENT


Eyes: Present: PERRL, EOM intact


ENT: hearing intact, clear oral mucosa, dentition normal





- Neck


Neck: Present: supple, normal ROM





- Respiratory


Respiratory effort: normal


Respiratory: bilateral: CTA





- Cardiovascular


Rhythm: regular


Heart Sounds: Present: S1 & S2.  Absent: gallop, rub





- Extremities


Extremities: no ischemia, No edema, Full ROM





- Abdominal


General gastrointestinal: soft, non-tender, non-distended, normal bowel sounds





- Integumentary


Integumentary: Present: clear, warm, dry





- Neurologic


Neurologic: CNII-XII intact, moves all extremities





Results





- Labs


CBC & Chem 7: 


 10/22/18 04:19





 10/22/18 04:19


Labs: 


 Laboratory Last Values











WBC  7.0 K/mm3 (4.5-11.0)   10/22/18  04:19    


 


RBC  3.48 M/mm3 (3.65-5.03)  L  10/22/18  04:19    


 


Hgb  11.3 gm/dl (10.1-14.3)   10/22/18  04:19    


 


Hct  33.9 % (30.3-42.9)   10/22/18  04:19    


 


MCV  97 fl (79-97)   10/22/18  04:19    


 


MCH  33 pg (28-32)  H  10/22/18  04:19    


 


MCHC  34 % (30-34)   10/22/18  04:19    


 


RDW  13.9 % (13.2-15.2)   10/22/18  04:19    


 


Plt Count  201 K/mm3 (140-440)   10/22/18  04:19    


 


Lymph % (Auto)  35.9 % (13.4-35.0)  H  10/22/18  04:19    


 


Mono % (Auto)  13.9 % (0.0-7.3)  H  10/22/18  04:19    


 


Eos % (Auto)  3.3 % (0.0-4.3)   10/22/18  04:19    


 


Baso % (Auto)  0.6 % (0.0-1.8)   10/22/18  04:19    


 


Lymph #  2.5 K/mm3 (1.2-5.4)   10/22/18  04:19    


 


Mono #  1.0 K/mm3 (0.0-0.8)  H  10/22/18  04:19    


 


Eos #  0.2 K/mm3 (0.0-0.4)   10/22/18  04:19    


 


Baso #  0.0 K/mm3 (0.0-0.1)   10/22/18  04:19    


 


Seg Neutrophils %  46.3 % (40.0-70.0)   10/22/18  04:19    


 


Seg Neutrophils #  3.3 K/mm3 (1.8-7.7)   10/22/18  04:19    


 


PT  13.2 Sec. (12.2-14.9)   10/22/18  04:19    


 


INR  0.95  (0.87-1.13)   10/22/18  04:19    


 


APTT  36.5 Sec. (24.2-36.6)   10/22/18  04:19    


 


Thrombin Time  15.5 Sec. (15.1-19.6)   10/16/18  00:39    


 


D-Dimer  558.12 ng/mlDDU (0-234)  H  10/16/18  15:00    


 


Sodium  137 mmol/L (137-145)   10/22/18  04:19    


 


Potassium  4.4 mmol/L (3.6-5.0)   10/22/18  04:19    


 


Chloride  97.1 mmol/L ()  L  10/22/18  04:19    


 


Carbon Dioxide  26 mmol/L (22-30)   10/22/18  04:19    


 


Anion Gap  18 mmol/L  10/22/18  04:19    


 


BUN  12 mg/dL (7-17)   10/22/18  04:19    


 


Creatinine  0.6 mg/dL (0.7-1.2)  L  10/22/18  04:19    


 


Estimated GFR  > 60 ml/min  10/22/18  04:19    


 


BUN/Creatinine Ratio  20 %  10/22/18  04:19    


 


Glucose  86 mg/dL ()   10/22/18  04:19    


 


POC Glucose  84  ()   10/21/18  17:13    


 


Calcium  9.8 mg/dL (8.4-10.2)   10/22/18  04:19    


 


Magnesium  1.70 mg/dL (1.7-2.3)   10/16/18  05:48    


 


Total Creatine Kinase  530 units/L ()  H  10/16/18  05:48    


 


CK-MB (CK-2)  5.9 ng/mL (0.0-4.0)  H  10/16/18  05:48    


 


CK-MB (CK-2) Rel Index  1.1  (0-4)   10/16/18  05:48    


 


Troponin T  0.022 ng/mL (0.00-0.029)   10/16/18  05:48    


 


TSH  2.910 mlU/mL (0.270-4.200)   10/19/18  17:13    


 


Free T4  1.24 ng/dL (0.76-1.46)   10/19/18  17:13    


 


Urine Opiates Screen  Presumptive negative   10/16/18  21:34    


 


Urine Methadone Screen  Presumptive negative   10/16/18  21:34    


 


Ur Barbiturates Screen  Presumptive negative   10/16/18  21:34    


 


Ur Phencyclidine Scrn  Presumptive negative   10/16/18  21:34    


 


Ur Amphetamines Screen  Presumptive negative   10/16/18  21:34    


 


U Benzodiazepines Scrn  Presumptive negative   10/16/18  21:34    


 


Urine Cocaine Screen  Presumptive negative   10/16/18  21:34    


 


U Marijuana (THC) Screen  Presumptive negative   10/16/18  21:34    


 


Drugs of Abuse Note  Disclamer   10/16/18  21:34

## 2018-10-22 NOTE — PROGRESS NOTE
Assessment and Plan


S/p [facility] this AM which showed mild CAD with some calcification in the prox part 

of the circ, mod dilated LV, EF 25-50%. 


Plan for PPM upgrade to BiV AICD tomorrow at approx 3:30PM. Indications, 

potential risks and benefits of procedure reviewed with pt and she is agreeable 

to proceed. NPO after MN. 





The patient has been seen in conjunction with Dr. Linares who agrees with the 

assessment and plan of care. 








- Patient Problems


(1) Cardiac arrest with ventricular fibrillation


Current Visit: Yes   Status: Acute   





(2) Nonischemic cardiomyopathy


Current Visit: Yes   Status: Chronic   





(3) Nonobstructive atherosclerosis of coronary artery


Current Visit: Yes   Status: Chronic   





(4) Cardiac pacemaker in situ


Current Visit: Yes   Status: Chronic   





(5) History of ETOH abuse


Current Visit: Yes   Status: Chronic   





(6) History of cocaine abuse


Current Visit: Yes   Status: Chronic   





(7) Tobacco use


Current Visit: Yes   Status: Chronic   





(8) Hypokalemia


Current Visit: Yes   Status: Acute   





Subjective


Date of service: 10/22/18


Principal diagnosis: cardiac arrest


Interval history: 


pt s/p [facility] this AM, no current cardiac complaints. 








Objective





  Last Vital Signs











Temp  98.1 F   10/22/18 10:10


 


Pulse  61   10/22/18 10:21


 


Resp  17   10/22/18 10:10


 


BP  112/73   10/22/18 10:21


 


Pulse Ox  98   10/22/18 10:10




















- Physical Examination


General: No Apparent Distress


HEENT: Positive: PERRL, Normocephaly, Mucus Membranes Moist


Neck: Positive: neck supple, trachea midline


Cardiac: Positive: Reg Rate and Rhythm, S1/S2


Lungs: Positive: clear to auscultation


Neuro: Positive: Grossly Intact


Abdomen: Positive: Soft.  Negative: Tender


Skin: Positive: Clear.  Negative: Rash, Wound


Musculoskeletal: No Pain


Extremities: Absent: edema





- Labs and Meds


 Coagulation











  10/22/18 Range/Units





  04:19 


 


PT  13.2  (12.2-14.9)  Sec.


 


INR  0.95  (0.87-1.13)  


 


APTT  36.5  (24.2-36.6)  Sec.








 CBC











  10/22/18 Range/Units





  04:19 


 


WBC  7.0  (4.5-11.0)  K/mm3


 


RBC  3.48 L  (3.65-5.03)  M/mm3


 


Hgb  11.3  (10.1-14.3)  gm/dl


 


Hct  33.9  (30.3-42.9)  %


 


Plt Count  201  (140-440)  K/mm3


 


Lymph #  2.5  (1.2-5.4)  K/mm3


 


Mono #  1.0 H  (0.0-0.8)  K/mm3


 


Eos #  0.2  (0.0-0.4)  K/mm3


 


Baso #  0.0  (0.0-0.1)  K/mm3








 Comprehensive Metabolic Panel











  10/22/18 Range/Units





  04:19 


 


Sodium  137  (137-145)  mmol/L


 


Potassium  4.4  (3.6-5.0)  mmol/L


 


Chloride  97.1 L  ()  mmol/L


 


Carbon Dioxide  26  (22-30)  mmol/L


 


BUN  12  (7-17)  mg/dL


 


Creatinine  0.6 L  (0.7-1.2)  mg/dL


 


Glucose  86  ()  mg/dL


 


Calcium  9.8  (8.4-10.2)  mg/dL














- Imaging and Cardiology


EKG: report reviewed, image reviewed


Echo: report reviewed (EF 20-25%, mild to mod TR, mod pulm HTN with RVSP 58mmHg

, mild MR, pacemaker wire in RV.)


Cardiac cath: report reviewed (2017 patent coronaries and ef 25%, )





- EKG


Ventricular dysrhythmias: ventricular premature com


Pacemaker: atrial pacing w/capture





- Allied health notes


Allied health notes reviewed: nursing

## 2018-10-22 NOTE — CARDIAC CATHERIZATION REPORT
INDICATION FOR PROCEDURE:  A 53-year-old -American female with previous

history of dilated cardiomyopathy, congestive heart failure with ejection

fraction of 20% in 05/2017.  Left heart catheterization was performed at that

time.  Repeat transthoracic echocardiogram done in 04/2018 showed ejection

fraction to be 50-55%.  Subsequently, she presented with a complete AV block

requiring permanent pacemaker insertion on 08/16/2018.  A permanent pacemaker

was inserted on 08/16/2018.  A dual chamber pacemaker was inserted.  She

presented this time to the Emergency Room at [facility] on

10/16/2018 with cardiac arrest.  Evaluation of the pacemaker showed VF arrest

that lasted many minutes.  She recovered well.  Presently cardiac

catheterization is being performed prior to the insertion of a defibrillator to

rule out underlying coronary disease.  In the past, she was told she has 30%

left main disease.



The patient was explained of the procedure, potential complications and

alternatives of therapy.





DESCRIPTION OF PROCEDURE:  The patient was brought to the catheterization

laboratory in a fasting condition.  The patient was prepared in standard

fashion.  Right wrist area was thoroughly cleansed with Betadine solution and

sterile drapes were applied. "Time out"was carried out and the patient was

sedated with IV Versed and fentanyl.  Subsequently, local anesthesia was given

in the right wrist area and 5-[place] slender sheath was introduced.  A 5-[place]

multipurpose catheter was used to obtain the angiograms of the left coronary

artery in multiple views followed by angiograms of the right coronary artery and

left ventriculogram was performed in SOLORZANO projection using hand injection.  At

the end of the procedure, catheter and sheath were removed and good hemostasis

was achieved with pressure bandage.

Patient was evaluated for moderate sedation,was felt to be appropriate 
candidate.Was monitored electrocardiographically,along with hemodynamic 
monitoring and pulse oximetry.Received sedation at 09:09 Am and ended at 09:29 
AM.No untoward complications were noted.





Following findings were noted:

HEMODYNAMICS:

1.  Opening aortic pressure was 114/69, left ventricular pressure 113/17, no

gradient across the aortic valve, estimated ejection fraction 25-30%.

2.  Left ventriculogram done in SOL[place]O projection showed moderately dilated left

ventricle with diffuse hypokinesis, ejection fraction was felt to be around

25-30%.  Only limited amount of dye was injected and mitral regurgitation could

not be evaluated.

3.  Right coronary artery dominant vessel arises normally from right coronary

cusp, but angiographically smooth and normal.

4.  Left coronary artery shows diffuse calcifications, particularly significant

amount of calcium in the area of proximal circumflex; however, luminal diameter

stenosis was only 30% in this area.  Left main is large, smooth without any

significant disease.  Similarly, LAD and its branch are angiographically smooth

with only minimal irregularities.  Circumflex artery as mentioned above showed

significant proximal circular calcification; however, no significant obstructive

disease was noted, only 30% smooth lesion was noted.  Rest of the circumflex

artery and its branch are angiographically smooth and normal

5.  Collaterals none.





FINAL IMPRESSION:

1.  Moderately dilated left ventricle with significant left ventricular

dysfunction, ejection fraction in the range of 25-30%.

2.  Only mild coronary disease with some calcification in the proximal part of

the circumflex artery.



At this time, considering the above angiographic pictures, continue risk factor

modification and aggressive medical therapy in addition to upgrading 

pacemaker to  BiV ICD.



At this time, the patient tolerated the procedure well.  No untoward

complications were noted.  Good hemostasis was achieved with radial band. 

Findings were explained to the patient and the patient was transferred to the

room in stable condition.





DD: 10/22/2018 09:47

DT: 10/22/2018 10:21

JOB# [id number]  [id number]

JAGJIT/SATHYA BRIGGS

## 2018-10-23 NOTE — VASCULAR LAB REPORT
CAROTID DUPLEX STUDY:



RIGHT        PSVEDV

CCA PROX:[id number]

CCA DIST:[id number]

ICA PROX:[id number]

ICA MID:[id number]

ICA DIST:[id number]

ECA:               [id number]

VERT:                  56       24  



LEFT         PSVEDV

CCA PROX:[id number]

CCA DIST:[id number]

ICA PROX:[id number]

ICA MID:[id number]

ICA DIST:[id number]

ECA:                    [id number]

VERT:                  72       26





REASON FOR EXAM: Syncope.

     

COMMENTS ON THE RIGHT:

Doppler frequency analysis is consistent with 16 to 49 percent diameter 

reduction of the internal carotid artery.  A small amount of plaque is 

seen.  The common carotid artery is patent. The external carotid artery 

is patent.  The vertebral artery has antegrade flow.



COMMENTS ON THE LEFT:

Doppler frequency analysis is consistent with 16 to 49 percent diameter 

reduction of the internal carotid artery.  A small amount of plaque is 

seen.  The common carotid artery is patent. The external carotid artery 

is patent.  The vertebral artery has antegrade flow.



IMPRESSION:

Less than 50% diameter reduction in the internal carotid arteries 

bilaterally.

## 2018-10-23 NOTE — PROGRESS NOTE
Assessment and Plan


Cont present management. 


For PPM upgrade to BiV AICD tomorrow at approx 3:30PM. 





The patient has been seen in conjunction with Dr. Linares who agrees with the 

assessment and plan of care. 








- Patient Problems


(1) Cardiac arrest with ventricular fibrillation


Current Visit: Yes   Status: Acute   





(2) Nonischemic cardiomyopathy


Current Visit: Yes   Status: Chronic   





(3) Nonobstructive atherosclerosis of coronary artery


Current Visit: Yes   Status: Chronic   





(4) Cardiac pacemaker in situ


Current Visit: Yes   Status: Chronic   





(5) History of ETOH abuse


Current Visit: Yes   Status: Chronic   





(6) History of cocaine abuse


Current Visit: Yes   Status: Chronic   





(7) Tobacco use


Current Visit: Yes   Status: Chronic   





(8) Hypokalemia


Current Visit: Yes   Status: Acute   





Subjective


Date of service: 10/23/18


Principal diagnosis: cardiac arrest


Interval history: 


pt resting in bed, no current cardiac complaints. awaiting AICD today. 








Objective


 Last Vital Signs











Temp  97.8 F   10/23/18 08:00


 


Pulse  69   10/23/18 10:13


 


Resp  17   10/23/18 08:00


 


BP  125/89   10/23/18 10:13


 


Pulse Ox  97   10/23/18 08:00

















- Physical Examination


General: No Apparent Distress


HEENT: Positive: PERRL, Normocephaly, Mucus Membranes Moist


Neck: Positive: neck supple, trachea midline


Cardiac: Positive: Reg Rate and Rhythm, S1/S2


Lungs: Positive: clear to auscultation


Neuro: Positive: Grossly Intact


Abdomen: Positive: Soft.  Negative: Tender


Skin: Positive: Clear.  Negative: Rash, Wound


Musculoskeletal: No Pain


Extremities: Absent: edema





- Labs and Meds


 Coagulation











  10/23/18 Range/Units





  03:51 


 


PT  13.4  (12.2-14.9)  Sec.


 


INR  0.97  (0.87-1.13)  








 CBC











  10/23/18 Range/Units





  03:51 


 


WBC  7.8  (4.5-11.0)  K/mm3


 


RBC  3.61 L  (3.65-5.03)  M/mm3


 


Hgb  11.7  (10.1-14.3)  gm/dl


 


Hct  34.8  (30.3-42.9)  %


 


Plt Count  201  (140-440)  K/mm3


 


Lymph #  2.4  (1.2-5.4)  K/mm3


 


Mono #  1.1 H  (0.0-0.8)  K/mm3


 


Eos #  0.2  (0.0-0.4)  K/mm3


 


Baso #  0.1  (0.0-0.1)  K/mm3








 Comprehensive Metabolic Panel











  10/23/18 Range/Units





  03:51 


 


Sodium  133 L  (137-145)  mmol/L


 


Potassium  4.3  (3.6-5.0)  mmol/L


 


Chloride  96.4 L  ()  mmol/L


 


Carbon Dioxide  25  (22-30)  mmol/L


 


BUN  13  (7-17)  mg/dL


 


Creatinine  0.5 L  (0.7-1.2)  mg/dL


 


Glucose  99  ()  mg/dL


 


Calcium  9.8  (8.4-10.2)  mg/dL














- Imaging and Cardiology


EKG: report reviewed, image reviewed


Echo: report reviewed (EF 20-25%, mild to mod TR, mod pulm HTN with RVSP 58mmHg

, mild MR, pacemaker wire in RV.)


Cardiac cath: report reviewed (10/23/2018: mild CAD with some calcification in 

the prox part of the circ, mod dilated LV, EF 25-50%; 2017 patent coronaries 

and ef 25%, )





- Telemetry


EKG Rhythm: Sinus Rhythm





- EKG


Ventricular dysrhythmias: ventricular premature com


Pacemaker: atrial pacing w/capture





- Allied health notes


Allied health notes reviewed: nursing

## 2018-10-23 NOTE — PROGRESS NOTE
Assessment and Plan


Assessment and plan: 


S/p V-fib arrest.  Patient with successful resuscitation.  Continue per 

cardiology recommendations.


-Natan negative for AMI. ECG shows NAF. 


-Echocardiogram revealed left ventricular systolic function severely decreased 

with EF of 20-25%.  Right ventricular global systolic function is normal.  

Right ventricular systolic pressure is calculated at 50 mmHg with evidence of 

moderate pulmonary hypertension


-PPM interrogation reveals 5min 30sec run of ventricular fibrillation which 

corresponds to pt's syncopal episode/cardiac arrest in the field. 


-Coronary angiogram on Monday and possible AICD replacement on Tuesday if 

coronaries are normal 


-Cards started low dose digoxin for better heart rate control patient's blood 

pressure is low and is on low-dose Coreg and lisinopril


-For PPM upgrade today.





Pulmonary hypertension.





Cardiomyopathy.  As above 





History of EtOH/cocaine abuse.  Continue withdrawal protocols.





Pneumonia.  Likely aspiration secondary from cardiopulmonary arrest. 





Hypokalemia. Resolved





Full code..








History


Interval history: 


Patient is s/p vfib arrest.


No chest pain currently








Hospitalist Physical





- Physical exam


Narrative exam: 


GEN: Not in acute distress, 


HEENT: Normocephalic, atraumatic, 


Neck: supple, No JVD


Lungs: Clear to auscultation bilaterally, no crackles, no wheeze 


Heart:S1 and S2 regular, no murmurs, rubs or gallop,  


Abd:soft, non-tender, non-distended, normal bowel sounds


Ext: No edema, no clubbing, no cyanosis


Neuro: AAO x 3, no focal neurological signs








- Constitutional


Vitals: 


 











Temp Pulse Resp BP Pulse Ox


 


 97.8 F   69   17   125/89   97 


 


 10/23/18 08:00  10/23/18 10:38  10/23/18 10:38  10/23/18 10:13  10/23/18 10:38











General appearance: Present: no acute distress





Results





- Labs


CBC & Chem 7: 


 10/23/18 03:51





 10/23/18 03:51


Labs: 


 Laboratory Last Values











WBC  7.8 K/mm3 (4.5-11.0)   10/23/18  03:51    


 


RBC  3.61 M/mm3 (3.65-5.03)  L  10/23/18  03:51    


 


Hgb  11.7 gm/dl (10.1-14.3)   10/23/18  03:51    


 


Hct  34.8 % (30.3-42.9)   10/23/18  03:51    


 


MCV  96 fl (79-97)   10/23/18  03:51    


 


MCH  32 pg (28-32)   10/23/18  03:51    


 


MCHC  34 % (30-34)   10/23/18  03:51    


 


RDW  13.1 % (13.2-15.2)  L  10/23/18  03:51    


 


Plt Count  201 K/mm3 (140-440)   10/23/18  03:51    


 


Lymph % (Auto)  31.3 % (13.4-35.0)   10/23/18  03:51    


 


Mono % (Auto)  14.5 % (0.0-7.3)  H  10/23/18  03:51    


 


Eos % (Auto)  2.6 % (0.0-4.3)   10/23/18  03:51    


 


Baso % (Auto)  1.1 % (0.0-1.8)   10/23/18  03:51    


 


Lymph #  2.4 K/mm3 (1.2-5.4)   10/23/18  03:51    


 


Mono #  1.1 K/mm3 (0.0-0.8)  H  10/23/18  03:51    


 


Eos #  0.2 K/mm3 (0.0-0.4)   10/23/18  03:51    


 


Baso #  0.1 K/mm3 (0.0-0.1)   10/23/18  03:51    


 


Seg Neutrophils %  50.5 % (40.0-70.0)   10/23/18  03:51    


 


Seg Neutrophils #  3.9 K/mm3 (1.8-7.7)   10/23/18  03:51    


 


PT  13.4 Sec. (12.2-14.9)   10/23/18  03:51    


 


INR  0.97  (0.87-1.13)   10/23/18  03:51    


 


APTT  36.5 Sec. (24.2-36.6)   10/22/18  04:19    


 


Thrombin Time  15.5 Sec. (15.1-19.6)   10/16/18  00:39    


 


D-Dimer  558.12 ng/mlDDU (0-234)  H  10/16/18  15:00    


 


Sodium  133 mmol/L (137-145)  L  10/23/18  03:51    


 


Potassium  4.3 mmol/L (3.6-5.0)   10/23/18  03:51    


 


Chloride  96.4 mmol/L ()  L  10/23/18  03:51    


 


Carbon Dioxide  25 mmol/L (22-30)   10/23/18  03:51    


 


Anion Gap  16 mmol/L  10/23/18  03:51    


 


BUN  13 mg/dL (7-17)   10/23/18  03:51    


 


Creatinine  0.5 mg/dL (0.7-1.2)  L  10/23/18  03:51    


 


Estimated GFR  > 60 ml/min  10/23/18  03:51    


 


BUN/Creatinine Ratio  26 %  10/23/18  03:51    


 


Glucose  99 mg/dL ()   10/23/18  03:51    


 


POC Glucose  84  ()   10/21/18  17:13    


 


Calcium  9.8 mg/dL (8.4-10.2)   10/23/18  03:51    


 


Magnesium  1.70 mg/dL (1.7-2.3)   10/16/18  05:48    


 


Total Creatine Kinase  530 units/L ()  H  10/16/18  05:48    


 


CK-MB (CK-2)  5.9 ng/mL (0.0-4.0)  H  10/16/18  05:48    


 


CK-MB (CK-2) Rel Index  1.1  (0-4)   10/16/18  05:48    


 


Troponin T  0.022 ng/mL (0.00-0.029)   10/16/18  05:48    


 


TSH  2.910 mlU/mL (0.270-4.200)   10/19/18  17:13    


 


Free T4  1.24 ng/dL (0.76-1.46)   10/19/18  17:13    


 


Urine Opiates Screen  Presumptive negative   10/16/18  21:34    


 


Urine Methadone Screen  Presumptive negative   10/16/18  21:34    


 


Ur Barbiturates Screen  Presumptive negative   10/16/18  21:34    


 


Ur Phencyclidine Scrn  Presumptive negative   10/16/18  21:34    


 


Ur Amphetamines Screen  Presumptive negative   10/16/18  21:34    


 


U Benzodiazepines Scrn  Presumptive negative   10/16/18  21:34    


 


Urine Cocaine Screen  Presumptive negative   10/16/18  21:34    


 


U Marijuana (THC) Screen  Presumptive negative   10/16/18  21:34    


 


Drugs of Abuse Note  Disclamer   10/16/18  21:34

## 2018-10-24 NOTE — PROGRESS NOTE
Assessment and Plan


Cont present management. 


For PPM upgrade to BiV AICD on Thursday - procedure postponed due to scheduling 

issues. 





The patient has been seen in conjunction with Dr. Linares who agrees with the 

assessment and plan of care. 








- Patient Problems


(1) Cardiac arrest with ventricular fibrillation


Current Visit: Yes   Status: Acute   





(2) Nonischemic cardiomyopathy


Current Visit: Yes   Status: Chronic   





(3) Nonobstructive atherosclerosis of coronary artery


Current Visit: Yes   Status: Chronic   





(4) Cardiac pacemaker in situ


Current Visit: Yes   Status: Chronic   





(5) History of ETOH abuse


Current Visit: Yes   Status: Chronic   





(6) History of cocaine abuse


Current Visit: Yes   Status: Chronic   





(7) Tobacco use


Current Visit: Yes   Status: Chronic   





(8) Hypokalemia


Current Visit: Yes   Status: Acute   





Subjective


Date of service: 10/24/18


Principal diagnosis: cardiac arrest


Interval history: 


pt resting in bed, no current cardiac complaints. awaiting AICD on Thursday.  








Objective





  Last Vital Signs











Temp  98.2 F   10/24/18 05:04


 


Pulse  77   10/24/18 09:24


 


Resp  20   10/24/18 05:04


 


BP  116/77   10/24/18 05:04


 


Pulse Ox  97   10/24/18 05:04

















- Physical Examination


General: No Apparent Distress


HEENT: Positive: PERRL, Normocephaly, Mucus Membranes Moist


Neck: Positive: neck supple, trachea midline


Cardiac: Positive: Reg Rate and Rhythm, S1/S2


Lungs: Positive: clear to auscultation


Neuro: Positive: Grossly Intact


Abdomen: Positive: Soft.  Negative: Tender


Skin: Positive: Clear.  Negative: Rash, Wound


Musculoskeletal: No Pain


Extremities: Absent: edema





- Imaging and Cardiology


EKG: report reviewed, image reviewed


Echo: report reviewed (EF 20-25%, mild to mod TR, mod pulm HTN with RVSP 58mmHg

, mild MR, pacemaker wire in RV.)


Cardiac cath: report reviewed (10/23/2018: mild CAD with some calcification in 

the prox part of the circ, mod dilated LV, EF 25-50%; 2017 patent coronaries 

and ef 25%, )





- Telemetry


EKG Rhythm: Paced





- EKG


Ventricular dysrhythmias: ventricular premature com


Pacemaker: atrial pacing w/capture





- Allied health notes


Allied health notes reviewed: nursing

## 2018-10-24 NOTE — PROGRESS NOTE
Assessment and Plan


Assessment and plan: 


S/p V-fib arrest.  Patient with successful resuscitation.  Continue per 

cardiology recommendations.


-Natan negative for AMI. ECG shows NAF. 


-Echocardiogram revealed left ventricular systolic function severely decreased 

with EF of 20-25%.  Right ventricular global systolic function is normal.  

Right ventricular systolic pressure is calculated at 50 mmHg with evidence of 

moderate pulmonary hypertension


-PPM interrogation reveals 5min 30sec run of ventricular fibrillation which 

corresponds to pt's syncopal episode/cardiac arrest in the field. 


-Coronary angiogram on Monday and possible AICD replacement on Tuesday if 

coronaries are normal 


-Cards started low dose digoxin for better heart rate control patient's blood 

pressure is low and is on low-dose Coreg and lisinopril


-For PPM upgrade tomorrow, was postponed.





Pulmonary hypertension.





Cardiomyopathy.


Cardiology following 





History of EtOH/cocaine abuse.  Continue withdrawal protocols.





Pneumonia.  





Hypokalemia. Resolved





Full code..








History


Interval history: 


Patient is s/p vfib arrest.


No chest pain currently


Pacemaker upgrade postponed to tomorrow








Hospitalist Physical





- Physical exam


Narrative exam: 


GEN: Not in acute distress, 


HEENT: Normocephalic, atraumatic, 


Neck: supple, No JVD


Lungs: Clear to auscultation bilaterally, no crackles, no wheeze 


Heart:S1 and S2 regular, no murmurs, rubs or gallop,  


Abd:soft, non-tender, non-distended, normal bowel sounds


Ext: No edema, no clubbing, no cyanosis


Neuro: AAO x 3, no focal neurological signs








- Constitutional


Vitals: 


 











Temp Pulse Resp BP Pulse Ox


 


 97.9 F   70   19   117/81   100 


 


 10/24/18 12:21  10/24/18 12:21  10/24/18 12:21  10/24/18 12:21  10/24/18 12:21











General appearance: Present: no acute distress





Results





- Labs


CBC & Chem 7: 


 10/23/18 03:51





 10/23/18 03:51


Labs: 


 Laboratory Last Values











WBC  7.8 K/mm3 (4.5-11.0)   10/23/18  03:51    


 


RBC  3.61 M/mm3 (3.65-5.03)  L  10/23/18  03:51    


 


Hgb  11.7 gm/dl (10.1-14.3)   10/23/18  03:51    


 


Hct  34.8 % (30.3-42.9)   10/23/18  03:51    


 


MCV  96 fl (79-97)   10/23/18  03:51    


 


MCH  32 pg (28-32)   10/23/18  03:51    


 


MCHC  34 % (30-34)   10/23/18  03:51    


 


RDW  13.1 % (13.2-15.2)  L  10/23/18  03:51    


 


Plt Count  201 K/mm3 (140-440)   10/23/18  03:51    


 


Lymph % (Auto)  31.3 % (13.4-35.0)   10/23/18  03:51    


 


Mono % (Auto)  14.5 % (0.0-7.3)  H  10/23/18  03:51    


 


Eos % (Auto)  2.6 % (0.0-4.3)   10/23/18  03:51    


 


Baso % (Auto)  1.1 % (0.0-1.8)   10/23/18  03:51    


 


Lymph #  2.4 K/mm3 (1.2-5.4)   10/23/18  03:51    


 


Mono #  1.1 K/mm3 (0.0-0.8)  H  10/23/18  03:51    


 


Eos #  0.2 K/mm3 (0.0-0.4)   10/23/18  03:51    


 


Baso #  0.1 K/mm3 (0.0-0.1)   10/23/18  03:51    


 


Seg Neutrophils %  50.5 % (40.0-70.0)   10/23/18  03:51    


 


Seg Neutrophils #  3.9 K/mm3 (1.8-7.7)   10/23/18  03:51    


 


PT  13.4 Sec. (12.2-14.9)   10/23/18  03:51    


 


INR  0.97  (0.87-1.13)   10/23/18  03:51    


 


APTT  36.5 Sec. (24.2-36.6)   10/22/18  04:19    


 


Thrombin Time  15.5 Sec. (15.1-19.6)   10/16/18  00:39    


 


D-Dimer  558.12 ng/mlDDU (0-234)  H  10/16/18  15:00    


 


Sodium  133 mmol/L (137-145)  L  10/23/18  03:51    


 


Potassium  4.3 mmol/L (3.6-5.0)   10/23/18  03:51    


 


Chloride  96.4 mmol/L ()  L  10/23/18  03:51    


 


Carbon Dioxide  25 mmol/L (22-30)   10/23/18  03:51    


 


Anion Gap  16 mmol/L  10/23/18  03:51    


 


BUN  13 mg/dL (7-17)   10/23/18  03:51    


 


Creatinine  0.5 mg/dL (0.7-1.2)  L  10/23/18  03:51    


 


Estimated GFR  > 60 ml/min  10/23/18  03:51    


 


BUN/Creatinine Ratio  26 %  10/23/18  03:51    


 


Glucose  99 mg/dL ()   10/23/18  03:51    


 


POC Glucose  97  ()   10/23/18  15:34    


 


Calcium  9.8 mg/dL (8.4-10.2)   10/23/18  03:51    


 


Magnesium  1.70 mg/dL (1.7-2.3)   10/16/18  05:48    


 


Total Creatine Kinase  530 units/L ()  H  10/16/18  05:48    


 


CK-MB (CK-2)  5.9 ng/mL (0.0-4.0)  H  10/16/18  05:48    


 


CK-MB (CK-2) Rel Index  1.1  (0-4)   10/16/18  05:48    


 


Troponin T  0.022 ng/mL (0.00-0.029)   10/16/18  05:48    


 


TSH  2.910 mlU/mL (0.270-4.200)   10/19/18  17:13    


 


Free T4  1.24 ng/dL (0.76-1.46)   10/19/18  17:13    


 


Urine Opiates Screen  Presumptive negative   10/16/18  21:34    


 


Urine Methadone Screen  Presumptive negative   10/16/18  21:34    


 


Ur Barbiturates Screen  Presumptive negative   10/16/18  21:34    


 


Ur Phencyclidine Scrn  Presumptive negative   10/16/18  21:34    


 


Ur Amphetamines Screen  Presumptive negative   10/16/18  21:34    


 


U Benzodiazepines Scrn  Presumptive negative   10/16/18  21:34    


 


Urine Cocaine Screen  Presumptive negative   10/16/18  21:34    


 


U Marijuana (THC) Screen  Presumptive negative   10/16/18  21:34    


 


Drugs of Abuse Note  Disclamer   10/16/18  21:34

## 2018-10-25 NOTE — XRAY REPORT
FINAL REPORT



EXAM:  XR CHEST 1V AP



HISTORY:  post pacemaker 



TECHNIQUE:  Frontal portable examination of the chest



PRIORS:  10/16/2018



FINDINGS:  

An electronic cardiac device remains in place and again obscures

a portion of the left chest, limiting the examination.  Cable

entry is via the left subclavian vein. 



Left lung remains clear. Cardiac silhouette size moderately

enlarged without definite evidence of vascular congestion. No

pneumothorax or pleural effusion.



Interval resolution of right upper lobe opacity. Near complete

resolution of right lower lung opacity.



IMPRESSION:  

Resolving right lung opacities may reflect improvement of edema

and/or pneumonitis



Cardiomegaly

## 2018-10-25 NOTE — PROGRESS NOTE
Assessment and Plan


Patient has ICD placement to day.Patient alert, awake. No acute respiratory 

distress.On room air. O2 saturation 97%.





- Patient Problems


(1) Cardiac arrest with successful resuscitation


Current Visit: Yes   Status: Acute   


Plan to address problem: 


Patient  has ICD placement today.








(2) TIA (transient ischemic attack)


Current Visit: Yes   Status: Acute   


Plan to address problem: 


Management as per primary care and neurology.








(3) Automatic implantable cardioverter-defibrillator in situ


Current Visit: Yes   Status: Chronic   


Plan to address problem: 


Management as per primary care.








(4) History of ETOH abuse


Current Visit: Yes   Status: Chronic   


Plan to address problem: 


Counselled to stop drinking alcohol.








(5) History of cardiomyopathy


Current Visit: Yes   Status: Chronic   


Plan to address problem: 


Management as per cardiology.








(6) History of cocaine abuse


Current Visit: Yes   Status: Chronic   


Plan to address problem: 


Counselled stop using cocain.








(7) Tobacco use


Current Visit: Yes   Status: Chronic   


Plan to address problem: 


Counselled to stop smoking.








(8) Pulmonary infiltrate in right lung on CXR


Current Visit: Yes   Status: Acute   


Plan to address problem: 


Patient afebrile


 No Leukocytosis


 Denies cough or shortness of breath.








Subjective


Date of service: 10/25/18


Principal diagnosis: Cardiac arrest with ROSC; Syncope; CMOP; Aspiration 

pneumonitis


Interval history: 





Patient has ICD placement to day.Patient alert, awake. No acute respiratory 

distress.On room air. O2 saturation 97%.





Objective


 Vital Signs - 12hr











  10/25/18 10/25/18





  04:43 08:40


 


Temperature 98.1 F 98.3 F


 


Pulse Rate 76 71


 


Respiratory 20 17





Rate  


 


Blood Pressure 123/77 122/83


 


O2 Sat by Pulse 98 99





Oximetry  











Constitutional: no acute distress, alert, other


Eyes: non-icteric


ENT: oropharynx moist


Neck: supple, no lymphadenopathy


Effort: normal


Ascultation: Bilateral: clear


Tactile fremitus: Bilateral: normal


Cardiovascular: regular rate and rhythm, other (paced)


Gastrointestinal: normoactive bowel sounds, soft, non-tender, non-distended


Integumentary: normal


Extremities: no cyanosis, no edema, pink and warm, pulses normal, no ischemia 

or petechiae


Neurologic: normal mental status, non-focal exam, pupils equal and round, motor 

strength normal and


Psychiatric: mood appropriate, affect normal


CBC and BMP: 


 10/25/18 05:32





 10/25/18 05:32


ABG, PT/INR, D-dimer: 


PT/INR, D-dimer











PT  12.9 Sec. (12.2-14.9)   10/25/18  05:32    


 


INR  0.92  (0.87-1.13)   10/25/18  05:32    


 


D-Dimer  558.12 ng/mlDDU (0-234)  H  10/16/18  15:00    








Abnormal lab findings: 


 Abnormal Labs











  10/16/18 10/16/18 10/16/18





  00:37 00:37 05:48


 


WBC  12.2 H  


 


RBC  3.59 L  


 


MCH  33 H  


 


RDW   


 


Lymph % (Auto)   


 


Mono % (Auto)  8.2 H  


 


Mono #  1.0 H  


 


Seg Neutrophils %  71.8 H  


 


Monocytes % (Manual)   


 


Seg Neutrophils #  8.8 H  


 


D-Dimer   


 


Sodium   


 


Potassium   2.8 L* 


 


Chloride   


 


Carbon Dioxide   


 


Creatinine   


 


Glucose   124 H 


 


POC Glucose   


 


Total Creatine Kinase    530 H


 


CK-MB (CK-2)    5.9 H














  10/16/18 10/16/18 10/17/18





  15:00 15:00 07:17


 


WBC   


 


RBC   


 


MCH   


 


RDW   


 


Lymph % (Auto)   


 


Mono % (Auto)   


 


Mono #   


 


Seg Neutrophils %   


 


Monocytes % (Manual)   


 


Seg Neutrophils #   


 


D-Dimer   558.12 H 


 


Sodium   


 


Potassium   


 


Chloride   


 


Carbon Dioxide  20 L  


 


Creatinine  0.6 L   0.5 L


 


Glucose  120 H   110 H


 


POC Glucose   


 


Total Creatine Kinase   


 


CK-MB (CK-2)   














  10/18/18 10/18/18 10/22/18





  04:33 04:33 04:19


 


WBC   


 


RBC  3.25 L   3.48 L


 


MCH  33 H   33 H


 


RDW   


 


Lymph % (Auto)    35.9 H


 


Mono % (Auto)  10.4 H   13.9 H


 


Mono #  0.9 H   1.0 H


 


Seg Neutrophils %   


 


Monocytes % (Manual)   


 


Seg Neutrophils #   


 


D-Dimer   


 


Sodium   


 


Potassium   


 


Chloride   


 


Carbon Dioxide   


 


Creatinine   0.6 L 


 


Glucose   113 H 


 


POC Glucose   


 


Total Creatine Kinase   


 


CK-MB (CK-2)   














  10/22/18 10/23/18 10/23/18





  04:19 03:51 03:51


 


WBC   


 


RBC   3.61 L 


 


MCH   


 


RDW   13.1 L 


 


Lymph % (Auto)   


 


Mono % (Auto)   14.5 H 


 


Mono #   1.1 H 


 


Seg Neutrophils %   


 


Monocytes % (Manual)   


 


Seg Neutrophils #   


 


D-Dimer   


 


Sodium    133 L


 


Potassium   


 


Chloride  97.1 L   96.4 L


 


Carbon Dioxide   


 


Creatinine  0.6 L   0.5 L


 


Glucose   


 


POC Glucose   


 


Total Creatine Kinase   


 


CK-MB (CK-2)   














  10/24/18 10/25/18 10/25/18





  21:22 05:32 05:32


 


WBC   


 


RBC   3.61 L 


 


MCH   


 


RDW   


 


Lymph % (Auto)   


 


Mono % (Auto)   


 


Mono #   


 


Seg Neutrophils %   


 


Monocytes % (Manual)   12.0 H 


 


Seg Neutrophils #   


 


D-Dimer   


 


Sodium    135 L


 


Potassium   


 


Chloride   


 


Carbon Dioxide   


 


Creatinine    0.6 L


 


Glucose    101 H


 


POC Glucose  109 H  


 


Total Creatine Kinase   


 


CK-MB (CK-2)   











Chest x-ray: report reviewed (Resiving right lung infiltrates.), image reviewed


Allied health notes reviewed: nursing

## 2018-10-25 NOTE — ANESTHESIA CONSULTATION
Anesthesia Consult and Med Hx


Date of service: 10/25/18





- Airway


Anesthetic Teeth Evaluation: Poor, Chipped


ROM Head & Neck: Adequate


Mental/Hyoid Distance: Adequate


Mallampati Class: Class II


Intubation Access Assessment: Probably Good





- Pulmonary Exam


CTA: Yes





- Cardiac Exam


Cardiac Exam: RRR





- Pre-Operative Health Status


ASA Pre-Surgery Classification: ASA3


Proposed Anesthetic Plan: General, MAC (EF approx 25%, CHF, HTN , no DM, denies 

GERD)





- Pulmonary


Hx Asthma: No


COPD: No


Hx Pneumonia: No





- Endocrine


Hx End Stage Renal Disease: No

## 2018-10-25 NOTE — PROGRESS NOTE
Assessment and Plan


Cont present management. 


For PPM upgrade to BiV AICD on today. 





The patient has been seen in conjunction with Dr. Linares who agrees with the 

assessment and plan of care. 








- Patient Problems


(1) Cardiac arrest with ventricular fibrillation


Current Visit: Yes   Status: Acute   





(2) Nonischemic cardiomyopathy


Current Visit: Yes   Status: Chronic   





(3) Nonobstructive atherosclerosis of coronary artery


Current Visit: Yes   Status: Chronic   





(4) Cardiac pacemaker in situ


Current Visit: Yes   Status: Chronic   





(5) History of ETOH abuse


Current Visit: Yes   Status: Chronic   





(6) History of cocaine abuse


Current Visit: Yes   Status: Chronic   





(7) Tobacco use


Current Visit: Yes   Status: Chronic   





(8) Hypokalemia


Current Visit: Yes   Status: Acute   





Subjective


Date of service: 10/25/18


Principal diagnosis: Cardiac arrest with ROSC; Syncope; CMOP; Aspiration 

pneumonitis


Interval history: 


pt resting in bed, no current cardiac complaints. for PPM exchange to AICD 

today.  








Objective





  Last Vital Signs











Temp  98.3 F   10/25/18 08:40


 


Pulse  71   10/25/18 08:40


 


Resp  17   10/25/18 08:40


 


BP  122/83   10/25/18 08:40


 


Pulse Ox  99   10/25/18 08:40

















- Physical Examination


General: No Apparent Distress


HEENT: Positive: PERRL, Normocephaly, Mucus Membranes Moist


Neck: Positive: neck supple, trachea midline


Cardiac: Positive: Reg Rate and Rhythm, S1/S2


Lungs: Positive: clear to auscultation


Neuro: Positive: Grossly Intact


Abdomen: Positive: Soft.  Negative: Tender


Skin: Positive: Clear.  Negative: Rash, Wound


Musculoskeletal: No Pain


Extremities: Absent: edema





- Labs and Meds


 Coagulation











  10/25/18 Range/Units





  05:32 


 


PT  12.9  (12.2-14.9)  Sec.


 


INR  0.92  (0.87-1.13)  








 CBC











  10/25/18 Range/Units





  05:32 


 


WBC  6.8  (4.5-11.0)  K/mm3


 


RBC  3.61 L  (3.65-5.03)  M/mm3


 


Hgb  11.4  (10.1-14.3)  gm/dl


 


Hct  35.1  (30.3-42.9)  %


 


Plt Count  227  (140-440)  K/mm3








 Comprehensive Metabolic Panel











  10/25/18 Range/Units





  05:32 


 


Sodium  135 L  (137-145)  mmol/L


 


Potassium  5.0  (3.6-5.0)  mmol/L


 


Chloride  98.0  ()  mmol/L


 


Carbon Dioxide  23  (22-30)  mmol/L


 


BUN  17  (7-17)  mg/dL


 


Creatinine  0.6 L  (0.7-1.2)  mg/dL


 


Glucose  101 H  ()  mg/dL


 


Calcium  9.4  (8.4-10.2)  mg/dL














- Imaging and Cardiology


EKG: report reviewed, image reviewed


Echo: report reviewed (EF 20-25%, mild to mod TR, mod pulm HTN with RVSP 58mmHg

, mild MR, pacemaker wire in RV.)


Cardiac cath: report reviewed (10/23/2018: mild CAD with some calcification in 

the prox part of the circ, mod dilated LV, EF 25-50%; 2017 patent coronaries 

and ef 25%, )





- EKG


Ventricular dysrhythmias: ventricular premature com


Pacemaker: atrial pacing w/capture





- Allied health notes


Allied health notes reviewed: nursing

## 2018-10-25 NOTE — PROGRESS NOTE
Assessment and Plan


Assessment and plan: 


S/p V-fib arrest.  Patient with successful resuscitation.  Continue per 

cardiology recommendations.


-Natan negative for AMI. ECG shows NAF. 


-Echocardiogram revealed left ventricular systolic function severely decreased 

with EF of 20-25%.  Right ventricular global systolic function is normal.  

Right ventricular systolic pressure is calculated at 50 mmHg with evidence of 

moderate pulmonary hypertension


-PPM interrogation reveals 5min 30sec run of ventricular fibrillation which 

corresponds to pt's syncopal episode/cardiac arrest in the field. 


-Cards started low dose digoxin for better heart rate control


-For PPM upgrade to BiV today, 





Pulmonary hypertension.





Cardiomyopathy.


Cardiology following 





History of EtOH/cocaine abuse.  Continue CIWA protocol.





Pneumonia.  





Hypokalemia. Resolved





Full code status.








History


Interval history: 


Patient is s/p vfib arrest.


No chest pain currently


Pacemaker upgrade postponed today








Hospitalist Physical





- Physical exam


Narrative exam: 


GEN: Not in acute distress, 


HEENT: Normocephalic, atraumatic, 


Neck: supple, No JVD


Lungs: Clear to auscultation bilaterally, no crackles, no wheeze 


Heart:S1 and S2 regular, no murmurs, rubs or gallop,  


Abd:soft, non-tender, non-distended, normal bowel sounds


Ext: No edema, no clubbing, no cyanosis


Neuro: AAO x 3, no focal neurological signs








- Constitutional


Vitals: 


 











Temp Pulse Resp BP Pulse Ox


 


 98.3 F   71   17   122/83   99 


 


 10/25/18 08:40  10/25/18 08:40  10/25/18 08:40  10/25/18 08:40  10/25/18 08:40











General appearance: Present: no acute distress





Results





- Labs


CBC & Chem 7: 


 10/25/18 05:32





 10/25/18 05:32


Labs: 


 Laboratory Last Values











WBC  6.8 K/mm3 (4.5-11.0)   10/25/18  05:32    


 


RBC  3.61 M/mm3 (3.65-5.03)  L  10/25/18  05:32    


 


Hgb  11.4 gm/dl (10.1-14.3)   10/25/18  05:32    


 


Hct  35.1 % (30.3-42.9)   10/25/18  05:32    


 


MCV  97 fl (79-97)   10/25/18  05:32    


 


MCH  32 pg (28-32)   10/25/18  05:32    


 


MCHC  33 % (30-34)   10/25/18  05:32    


 


RDW  13.5 % (13.2-15.2)   10/25/18  05:32    


 


Plt Count  227 K/mm3 (140-440)   10/25/18  05:32    


 


Lymph % (Auto)  31.3 % (13.4-35.0)   10/23/18  03:51    


 


Mono % (Auto)  Np   10/25/18  05:32    


 


Eos % (Auto)  2.6 % (0.0-4.3)   10/23/18  03:51    


 


Baso % (Auto)  1.1 % (0.0-1.8)   10/23/18  03:51    


 


Lymph #  2.4 K/mm3 (1.2-5.4)   10/23/18  03:51    


 


Mono #  1.1 K/mm3 (0.0-0.8)  H  10/23/18  03:51    


 


Eos #  0.2 K/mm3 (0.0-0.4)   10/23/18  03:51    


 


Baso #  0.1 K/mm3 (0.0-0.1)   10/23/18  03:51    


 


Add Manual Diff  Complete   10/25/18  05:32    


 


Total Counted  100   10/25/18  05:32    


 


Seg Neutrophils %  50.5 % (40.0-70.0)   10/23/18  03:51    


 


Seg Neuts % (Manual)  54.0 % (40.0-70.0)   10/25/18  05:32    


 


Band Neutrophils %  0 %  10/25/18  05:32    


 


Lymphocytes % (Manual)  34.0 % (13.4-35.0)   10/25/18  05:32    


 


Reactive Lymphs % (Man)  0 %  10/25/18  05:32    


 


Monocytes % (Manual)  12.0 % (0.0-7.3)  H  10/25/18  05:32    


 


Eosinophils % (Manual)  0 % (0.0-4.3)   10/25/18  05:32    


 


Basophils % (Manual)  0 % (0.0-1.8)   10/25/18  05:32    


 


Metamyelocytes %  0 %  10/25/18  05:32    


 


Myelocytes %  0 %  10/25/18  05:32    


 


Promyelocytes %  0 %  10/25/18  05:32    


 


Blast Cells %  0 %  10/25/18  05:32    


 


Nucleated RBC %  Not Reportable   10/25/18  05:32    


 


Seg Neutrophils #  3.9 K/mm3 (1.8-7.7)   10/23/18  03:51    


 


Seg Neutrophils # Man  3.7 K/mm3 (1.8-7.7)   10/25/18  05:32    


 


Band Neutrophils #  0.0 K/mm3  10/25/18  05:32    


 


Lymphocytes # (Manual)  2.3 K/mm3 (1.2-5.4)   10/25/18  05:32    


 


Abs React Lymphs (Man)  0.0 K/mm3  10/25/18  05:32    


 


Monocytes # (Manual)  0.8 K/mm3 (0.0-0.8)   10/25/18  05:32    


 


Eosinophils # (Manual)  0.0 K/mm3 (0.0-0.4)   10/25/18  05:32    


 


Basophils # (Manual)  0.0 K/mm3 (0.0-0.1)   10/25/18  05:32    


 


Metamyelocytes #  0.0 K/mm3  10/25/18  05:32    


 


Myelocytes #  0.0 K/mm3  10/25/18  05:32    


 


Promyelocytes #  0.0 K/mm3  10/25/18  05:32    


 


Blast Cells #  0.0 K/mm3  10/25/18  05:32    


 


WBC Morphology  Not Reportable   10/25/18  05:32    


 


Hypersegmented Neuts  Not Reportable   10/25/18  05:32    


 


Hyposegmented Neuts  Not Reportable   10/25/18  05:32    


 


Hypogranular Neuts  Not Reportable   10/25/18  05:32    


 


Smudge Cells  Not Reportable   10/25/18  05:32    


 


Toxic Granulation  Not Reportable   10/25/18  05:32    


 


Toxic Vacuolation  Not Reportable   10/25/18  05:32    


 


Dohle Bodies  Not Reportable   10/25/18  05:32    


 


Pelger-Huet Anomaly  Not Reportable   10/25/18  05:32    


 


Jimy Rods  Not Reportable   10/25/18  05:32    


 


Platelet Estimate  Appears normal   10/25/18  05:32    


 


Clumped Platelets  Not Reportable   10/25/18  05:32    


 


Plt Clumps, EDTA  Not Reportable   10/25/18  05:32    


 


Large Platelets  Not Reportable   10/25/18  05:32    


 


Giant Platelets  Not Reportable   10/25/18  05:32    


 


Platelet Satelliting  Not Reportable   10/25/18  05:32    


 


Plt Morphology Comment  Not Reportable   10/25/18  05:32    


 


RBC Morphology  Not Reportable   10/25/18  05:32    


 


Dimorphic RBCs  Not Reportable   10/25/18  05:32    


 


Polychromasia  Not Reportable   10/25/18  05:32    


 


Hypochromasia  Not Reportable   10/25/18  05:32    


 


Poikilocytosis  Few   10/25/18  05:32    


 


Anisocytosis  Few   10/25/18  05:32    


 


Microcytosis  Not Reportable   10/25/18  05:32    


 


Macrocytosis  Not Reportable   10/25/18  05:32    


 


Spherocytes  Not Reportable   10/25/18  05:32    


 


Pappenheimer Bodies  Not Reportable   10/25/18  05:32    


 


Sickle Cells  Not Reportable   10/25/18  05:32    


 


Target Cells  Not Reportable   10/25/18  05:32    


 


Tear Drop Cells  Not Reportable   10/25/18  05:32    


 


Ovalocytes  Not Reportable   10/25/18  05:32    


 


Helmet Cells  Not Reportable   10/25/18  05:32    


 


[place] Bodies  Not Reportable   10/25/18  05:32    


 


Cabot Rings  Not Reportable   10/25/18  05:32    


 


Kari Cells  Not Reportable   10/25/18  05:32    


 


Bite Cells  Not Reportable   10/25/18  05:32    


 


Crenated Cell  Not Reportable   10/25/18  05:32    


 


Elliptocytes  Not Reportable   10/25/18  05:32    


 


Acanthocytes (Spur)  Not Reportable   10/25/18  05:32    


 


Rouleaux  Not Reportable   10/25/18  05:32    


 


Hemoglobin C Crystals  Not Reportable   10/25/18  05:32    


 


Schistocytes  Not Reportable   10/25/18  05:32    


 


Malaria parasites  Not Reportable   10/25/18  05:32    


 


Jerrod Bodies  Not Reportable   10/25/18  05:32    


 


Hem Pathologist Commnt  No   10/25/18  05:32    


 


PT  12.9 Sec. (12.2-14.9)   10/25/18  05:32    


 


INR  0.92  (0.87-1.13)   10/25/18  05:32    


 


APTT  36.5 Sec. (24.2-36.6)   10/22/18  04:19    


 


Thrombin Time  15.5 Sec. (15.1-19.6)   10/16/18  00:39    


 


D-Dimer  558.12 ng/mlDDU (0-234)  H  10/16/18  15:00    


 


Sodium  135 mmol/L (137-145)  L  10/25/18  05:32    


 


Potassium  5.0 mmol/L (3.6-5.0)   10/25/18  05:32    


 


Chloride  98.0 mmol/L ()   10/25/18  05:32    


 


Carbon Dioxide  23 mmol/L (22-30)   10/25/18  05:32    


 


Anion Gap  19 mmol/L  10/25/18  05:32    


 


BUN  17 mg/dL (7-17)   10/25/18  05:32    


 


Creatinine  0.6 mg/dL (0.7-1.2)  L  10/25/18  05:32    


 


Estimated GFR  > 60 ml/min  10/25/18  05:32    


 


BUN/Creatinine Ratio  28 %  10/25/18  05:32    


 


Glucose  101 mg/dL ()  H  10/25/18  05:32    


 


POC Glucose  98  ()   10/25/18  06:02    


 


Calcium  9.4 mg/dL (8.4-10.2)   10/25/18  05:32    


 


Magnesium  1.70 mg/dL (1.7-2.3)   10/16/18  05:48    


 


Total Creatine Kinase  530 units/L ()  H  10/16/18  05:48    


 


CK-MB (CK-2)  5.9 ng/mL (0.0-4.0)  H  10/16/18  05:48    


 


CK-MB (CK-2) Rel Index  1.1  (0-4)   10/16/18  05:48    


 


Troponin T  0.022 ng/mL (0.00-0.029)   10/16/18  05:48    


 


TSH  2.910 mlU/mL (0.270-4.200)   10/19/18  17:13    


 


Free T4  1.24 ng/dL (0.76-1.46)   10/19/18  17:13    


 


Urine Opiates Screen  Presumptive negative   10/16/18  21:34    


 


Urine Methadone Screen  Presumptive negative   10/16/18  21:34    


 


Ur Barbiturates Screen  Presumptive negative   10/16/18  21:34    


 


Ur Phencyclidine Scrn  Presumptive negative   10/16/18  21:34    


 


Ur Amphetamines Screen  Presumptive negative   10/16/18  21:34    


 


U Benzodiazepines Scrn  Presumptive negative   10/16/18  21:34    


 


Urine Cocaine Screen  Presumptive negative   10/16/18  21:34    


 


U Marijuana (THC) Screen  Presumptive negative   10/16/18  21:34    


 


Drugs of Abuse Note  Disclamer   10/16/18  21:34

## 2018-10-26 NOTE — PROGRESS NOTE
Assessment and Plan


Patient has ICD placement to day.Patient alert, awake. No acute respiratory 

distress.On room air. O2 saturation 97%.





- Patient Problems


(1) Cardiac arrest with successful resuscitation


Current Visit: Yes   Status: Acute   


Plan to address problem: 


Patient  has ICD placement today.








(2) TIA (transient ischemic attack)


Current Visit: Yes   Status: Acute   


Plan to address problem: 


Management as per primary care and neurology.








(3) Automatic implantable cardioverter-defibrillator in situ


Current Visit: Yes   Status: Chronic   


Plan to address problem: 


Management as per primary care.








(4) History of ETOH abuse


Current Visit: Yes   Status: Resolved   


Plan to address problem: 


Counselled to stop drinking alcohol.








(5) History of cardiomyopathy


Current Visit: Yes   Status: Chronic   


Plan to address problem: 


Management as per cardiology.








(6) History of cocaine abuse


Current Visit: Yes   Status: Chronic   


Plan to address problem: 


Counselled stop using cocain.








(7) Tobacco use


Current Visit: Yes   Status: Chronic   


Plan to address problem: 


Counselled to stop smoking.








(8) Pulmonary infiltrate in right lung on CXR


Current Visit: Yes   Status: Acute   


Plan to address problem: 


Patient afebrile


 No Leukocytosis


 Denies cough or shortness of breath.








Subjective


Date of service: 10/26/18


Principal diagnosis: Cardiac arrest with ROSC; Syncope; CMOP; Aspiration 

pneumonitis


Interval history: 





Patient has ICD placement to day.Patient alert, awake. No acute respiratory 

distress.On room air. O2 saturation 97%.





Objective


 Vital Signs - 12hr











  10/26/18 10/26/18 10/26/18





  07:53 12:22 16:50


 


Temperature 98.1 F 97.8 F 98.0 F


 


Pulse Rate 85 88 82


 


Respiratory 18 18 18





Rate   


 


Blood Pressure 120/83 122/72 102/68





[Left]   


 


O2 Sat by Pulse 99 100 99





Oximetry   














  10/26/18





  17:00


 


Temperature 


 


Pulse Rate 78


 


Respiratory 





Rate 


 


Blood Pressure 





[Left] 


 


O2 Sat by Pulse 





Oximetry 











Constitutional: no acute distress, alert, other


Eyes: non-icteric


ENT: oropharynx moist


Neck: supple, no lymphadenopathy


Effort: normal


Ascultation: Bilateral: clear


Tactile fremitus: Bilateral: normal


Cardiovascular: regular rate and rhythm, other (paced)


Gastrointestinal: normoactive bowel sounds, soft, non-tender, non-distended


Integumentary: normal


Extremities: no cyanosis, no edema, pink and warm, pulses normal, no ischemia 

or petechiae


Neurologic: normal mental status, non-focal exam, pupils equal and round, motor 

strength normal and


Psychiatric: mood appropriate, affect normal


CBC and BMP: 


 10/25/18 05:32





 10/25/18 05:32


ABG, PT/INR, D-dimer: 


PT/INR, D-dimer











PT  12.9 Sec. (12.2-14.9)   10/25/18  05:32    


 


INR  0.92  (0.87-1.13)   10/25/18  05:32    


 


D-Dimer  558.12 ng/mlDDU (0-234)  H  10/16/18  15:00    








Abnormal lab findings: 


 Abnormal Labs











  10/16/18 10/16/18 10/16/18





  00:37 00:37 05:48


 


WBC  12.2 H  


 


RBC  3.59 L  


 


MCH  33 H  


 


RDW   


 


Lymph % (Auto)   


 


Mono % (Auto)  8.2 H  


 


Mono #  1.0 H  


 


Seg Neutrophils %  71.8 H  


 


Monocytes % (Manual)   


 


Seg Neutrophils #  8.8 H  


 


D-Dimer   


 


Sodium   


 


Potassium   2.8 L* 


 


Chloride   


 


Carbon Dioxide   


 


Creatinine   


 


Glucose   124 H 


 


POC Glucose   


 


Total Creatine Kinase    530 H


 


CK-MB (CK-2)    5.9 H














  10/16/18 10/16/18 10/17/18





  15:00 15:00 07:17


 


WBC   


 


RBC   


 


MCH   


 


RDW   


 


Lymph % (Auto)   


 


Mono % (Auto)   


 


Mono #   


 


Seg Neutrophils %   


 


Monocytes % (Manual)   


 


Seg Neutrophils #   


 


D-Dimer   558.12 H 


 


Sodium   


 


Potassium   


 


Chloride   


 


Carbon Dioxide  20 L  


 


Creatinine  0.6 L   0.5 L


 


Glucose  120 H   110 H


 


POC Glucose   


 


Total Creatine Kinase   


 


CK-MB (CK-2)   














  10/18/18 10/18/18 10/22/18





  04:33 04:33 04:19


 


WBC   


 


RBC  3.25 L   3.48 L


 


MCH  33 H   33 H


 


RDW   


 


Lymph % (Auto)    35.9 H


 


Mono % (Auto)  10.4 H   13.9 H


 


Mono #  0.9 H   1.0 H


 


Seg Neutrophils %   


 


Monocytes % (Manual)   


 


Seg Neutrophils #   


 


D-Dimer   


 


Sodium   


 


Potassium   


 


Chloride   


 


Carbon Dioxide   


 


Creatinine   0.6 L 


 


Glucose   113 H 


 


POC Glucose   


 


Total Creatine Kinase   


 


CK-MB (CK-2)   














  10/22/18 10/23/18 10/23/18





  04:19 03:51 03:51


 


WBC   


 


RBC   3.61 L 


 


MCH   


 


RDW   13.1 L 


 


Lymph % (Auto)   


 


Mono % (Auto)   14.5 H 


 


Mono #   1.1 H 


 


Seg Neutrophils %   


 


Monocytes % (Manual)   


 


Seg Neutrophils #   


 


D-Dimer   


 


Sodium    133 L


 


Potassium   


 


Chloride  97.1 L   96.4 L


 


Carbon Dioxide   


 


Creatinine  0.6 L   0.5 L


 


Glucose   


 


POC Glucose   


 


Total Creatine Kinase   


 


CK-MB (CK-2)   














  10/24/18 10/25/18 10/25/18





  21:22 05:32 05:32


 


WBC   


 


RBC   3.61 L 


 


MCH   


 


RDW   


 


Lymph % (Auto)   


 


Mono % (Auto)   


 


Mono #   


 


Seg Neutrophils %   


 


Monocytes % (Manual)   12.0 H 


 


Seg Neutrophils #   


 


D-Dimer   


 


Sodium    135 L


 


Potassium   


 


Chloride   


 


Carbon Dioxide   


 


Creatinine    0.6 L


 


Glucose    101 H


 


POC Glucose  109 H  


 


Total Creatine Kinase   


 


CK-MB (CK-2)   














  10/25/18 10/26/18





  21:35 12:21


 


WBC  


 


RBC  


 


MCH  


 


RDW  


 


Lymph % (Auto)  


 


Mono % (Auto)  


 


Mono #  


 


Seg Neutrophils %  


 


Monocytes % (Manual)  


 


Seg Neutrophils #  


 


D-Dimer  


 


Sodium  


 


Potassium  


 


Chloride  


 


Carbon Dioxide  


 


Creatinine  


 


Glucose  


 


POC Glucose  121 H  106 H


 


Total Creatine Kinase  


 


CK-MB (CK-2)  











Allied health notes reviewed: nursing

## 2018-10-26 NOTE — PROGRESS NOTE
Assessment and Plan


S/p PPM upgrade to AICD yesterday. Post-procedure CXR shows NAF, no 

pneumothorax. Device interrogation this AM showed normal device function. 


Currently stable cardiac status. Cont present management. 


Pt may discharge home from cardiology standpoint. 


At discharge, recommend PO Keflex 500mg BID x 10 days. Cont all other present 

cardiac management. D/w Dr. Nazario. 


Follow up in our [place] office for incision check on 11/2/2018 @ 10:00AM. 


Pt wishes to follow up with [place] [facility]. Recommend pt follow up within 1 

week of hospital discharge. Pt verbalizes understanding. 





The patient has been seen in conjunction with Dr. Linares who agrees with the 

assessment and plan of care. 








- Patient Problems


(1) Cardiac arrest with ventricular fibrillation


Current Visit: Yes   Status: Acute   





(2) Nonischemic cardiomyopathy


Current Visit: Yes   Status: Chronic   





(3) Nonobstructive atherosclerosis of coronary artery


Current Visit: Yes   Status: Chronic   





(4) Cardiac pacemaker in situ


Current Visit: Yes   Status: Chronic   





(5) History of ETOH abuse


Current Visit: Yes   Status: Chronic   





(6) History of cocaine abuse


Current Visit: Yes   Status: Chronic   





(7) Tobacco use


Current Visit: Yes   Status: Chronic   





(8) Hypokalemia


Current Visit: Yes   Status: Acute   





Subjective


Date of service: 10/26/18


Principal diagnosis: Cardiac arrest with ROSC; Syncope; CMOP; Aspiration 

pneumonitis


Interval history: 


pt resting in bed, no current cardiac complaints. AICD implantation site 

pressure dressing removed, covered with telfa and tegaderm dressing, site c/d/i

, no evidence of bleeding or hematoma noted. 








Objective





  Last Vital Signs











Temp  98.1 F   10/26/18 07:53


 


Pulse  85   10/26/18 07:53


 


Resp  18   10/26/18 07:53


 


BP  120/83   10/26/18 07:53


 


Pulse Ox  99   10/26/18 07:53

















- Physical Examination


General: No Apparent Distress


HEENT: Positive: PERRL, Normocephaly, Mucus Membranes Moist


Neck: Positive: neck supple, trachea midline


Cardiac: Positive: Reg Rate and Rhythm, S1/S2


Lungs: Positive: clear to auscultation


Neuro: Positive: Grossly Intact


Abdomen: Positive: Soft.  Negative: Tender


Skin: Positive: Clear.  Negative: Rash, Wound


Incision: Incision Site (AICD implantation site pressure dressing removed, site 

c/d/i, no evidence of bleeding or hematoma. )


Musculoskeletal: No Pain


Extremities: Absent: edema





- Imaging and Cardiology


EKG: report reviewed, image reviewed


Echo: report reviewed (EF 20-25%, mild to mod TR, mod pulm HTN with RVSP 58mmHg

, mild MR, pacemaker wire in RV.)


Cardiac cath: report reviewed (10/23/2018: mild CAD with some calcification in 

the prox part of the circ, mod dilated LV, EF 25-50%; 2017 patent coronaries 

and ef 25%, )





- Telemetry


EKG Rhythm: Sinus Rhythm





- EKG


Ventricular dysrhythmias: ventricular premature com


Pacemaker: atrial pacing w/capture





- Allied health notes


Allied health notes reviewed: nursing

## 2018-10-26 NOTE — DISCHARGE SUMMARY
Providers





- Providers


Date of Admission: 


10/16/18 05:25





Date of discharge: 10/26/18


Attending physician: 


ELIANA CONTRERAS





 





10/16/18 05:32


Consult to Physician [CONS] Routine 


   Comment: 


   Consulting Provider: LUISA CRUZ


   Physician Instructions: 


   Reason For Exam: CARDIAC ARREST  WITH SYNCOPE





10/16/18 05:44


Consult to Physician [CONS] Routine 


   Comment: 


   Consulting Provider: GAGANDEEP COOLEY


   Physician Instructions: 


   Reason For Exam: CARDIAC ARREST





10/22/18 10:50


Consult to Cardiac Rehabilitation [CONS] Routine 


   Reason For Exam: Cardiac Rehab Evaluation











Primary care physician: 


PRIMARY CARE MD








Hospitalization


Condition: Fair


Disposition: DC/TX-06 HOME UNDER HOME [facility]





Core Measure Documentation





- Palliative Care


Palliative Care/ Comfort Measures: Not Applicable





- Core Measures


Any of the following diagnoses?: none





Exam





- Constitutional


Vitals: 


 











Temp Pulse Resp BP Pulse Ox


 


 98.1 F   85   18   120/83   99 


 


 10/26/18 07:53  10/26/18 07:53  10/26/18 07:53  10/26/18 07:53  10/26/18 07:53














Plan


Activity: advance as tolerated


Diet: low fat, low cholesterol, low salt


Special Instructions: home health RN


Additional Instructions: 1.Follow up with PCP in 1 week.  2.Follow up with 

Cardiology at [place] in 1 week.  3.Follow up with [facility] on 11 /2/18 at 10:00 am for incision check.


Prescriptions: 


Carvedilol [Coreg] 3.125 mg PO BID #60 tablet


Digoxin [Lanoxin] 0.125 mg PO DAILY@1700 #30 tablet


HYDROcodone/APAP 5-325 [Norco 5-325 mg TAB] 1 each PO Q6H PRN #10 tablet


 PRN Reason: Pain, Moderate (4-6)


Lisinopril [Zestril TAB] 5 mg PO QDAY #30 tablet

## 2020-05-25 ENCOUNTER — HOSPITAL ENCOUNTER (EMERGENCY)
Dept: HOSPITAL 5 - ED | Age: 55
Discharge: HOME | End: 2020-05-25
Payer: SELF-PAY

## 2020-05-25 VITALS — SYSTOLIC BLOOD PRESSURE: 135 MMHG | DIASTOLIC BLOOD PRESSURE: 73 MMHG

## 2020-05-25 DIAGNOSIS — Z79.899: ICD-10-CM

## 2020-05-25 DIAGNOSIS — Z95.810: ICD-10-CM

## 2020-05-25 DIAGNOSIS — I50.9: ICD-10-CM

## 2020-05-25 DIAGNOSIS — D25.9: ICD-10-CM

## 2020-05-25 DIAGNOSIS — F17.200: ICD-10-CM

## 2020-05-25 DIAGNOSIS — I25.2: ICD-10-CM

## 2020-05-25 DIAGNOSIS — I11.0: ICD-10-CM

## 2020-05-25 DIAGNOSIS — R10.32: ICD-10-CM

## 2020-05-25 DIAGNOSIS — N39.0: Primary | ICD-10-CM

## 2020-05-25 LAB
ALBUMIN SERPL-MCNC: 4.6 G/DL (ref 3.9–5)
ALT SERPL-CCNC: 20 UNITS/L (ref 7–56)
BACTERIA #/AREA URNS HPF: (no result) /HPF
BASOPHILS # (AUTO): 0.1 K/MM3 (ref 0–0.1)
BASOPHILS NFR BLD AUTO: 1.4 % (ref 0–1.8)
BILIRUB DIRECT SERPL-MCNC: < 0.2 MG/DL (ref 0–0.2)
BILIRUB UR QL STRIP: (no result)
BLOOD UR QL VISUAL: (no result)
BUN SERPL-MCNC: 9 MG/DL (ref 7–17)
BUN/CREAT SERPL: 15 %
CALCIUM SERPL-MCNC: 10 MG/DL (ref 8.4–10.2)
EOSINOPHIL # BLD AUTO: 0.2 K/MM3 (ref 0–0.4)
EOSINOPHIL NFR BLD AUTO: 3.6 % (ref 0–4.3)
HCT VFR BLD CALC: 39.8 % (ref 30.3–42.9)
HEMOLYSIS INDEX: 28
HGB BLD-MCNC: 13.4 GM/DL (ref 10.1–14.3)
LYMPHOCYTES # BLD AUTO: 1.7 K/MM3 (ref 1.2–5.4)
LYMPHOCYTES NFR BLD AUTO: 28.3 % (ref 13.4–35)
MCHC RBC AUTO-ENTMCNC: 34 % (ref 30–34)
MCV RBC AUTO: 94 FL (ref 79–97)
MONOCYTES # (AUTO): 0.6 K/MM3 (ref 0–0.8)
MONOCYTES % (AUTO): 10.9 % (ref 0–7.3)
PH UR STRIP: 5 [PH] (ref 5–7)
PLATELET # BLD: 201 K/MM3 (ref 140–440)
PROT UR STRIP-MCNC: (no result) MG/DL
RBC # BLD AUTO: 4.23 M/MM3 (ref 3.65–5.03)
RBC #/AREA URNS HPF: < 1 /HPF (ref 0–6)
UROBILINOGEN UR-MCNC: < 2 MG/DL (ref ?–2)
WBC #/AREA URNS HPF: 9 /HPF (ref 0–6)

## 2020-05-25 PROCEDURE — 87186 SC STD MICRODIL/AGAR DIL: CPT

## 2020-05-25 PROCEDURE — 87076 CULTURE ANAEROBE IDENT EACH: CPT

## 2020-05-25 PROCEDURE — 74019 RADEX ABDOMEN 2 VIEWS: CPT

## 2020-05-25 PROCEDURE — 80048 BASIC METABOLIC PNL TOTAL CA: CPT

## 2020-05-25 PROCEDURE — 99285 EMERGENCY DEPT VISIT HI MDM: CPT

## 2020-05-25 PROCEDURE — 36415 COLL VENOUS BLD VENIPUNCTURE: CPT

## 2020-05-25 PROCEDURE — 87086 URINE CULTURE/COLONY COUNT: CPT

## 2020-05-25 PROCEDURE — 80076 HEPATIC FUNCTION PANEL: CPT

## 2020-05-25 PROCEDURE — 83690 ASSAY OF LIPASE: CPT

## 2020-05-25 PROCEDURE — 74177 CT ABD & PELVIS W/CONTRAST: CPT

## 2020-05-25 PROCEDURE — 85025 COMPLETE CBC W/AUTO DIFF WBC: CPT

## 2020-05-25 PROCEDURE — 81001 URINALYSIS AUTO W/SCOPE: CPT

## 2020-05-25 NOTE — EMERGENCY DEPARTMENT REPORT
HPI





- General


Chief Complaint: Abdominal Pain


Time Seen by Provider: 05/25/20 10:51





- HPI


HPI: 





54-year-old -American female presents to the emergency department from 

home with complaint of left lower quadrant abdominal pain that has been going on

over the past month.  Patient says that she always has some level of a constant 

pain but the intensity increases or decreases depending on her positioning.  She

denies any fever, nausea, vomiting, dysuria, vaginal bleeding or discharge, 

constipation or diarrhea.  The patient thought it could be related to a urinary 

tract infection and has been drinking cranberry juice without any relief.  She 

has a past medical history of CHF, coronary artery disease with previous MI, 

hypertension and an AICD in place.  She does not have a primary care physician. 

No recent travel or sick contacts at home.  





ED Past Medical Hx





- Past Medical History


Previous Medical History?: Yes


Hx Hypertension: Yes


Hx Heart Attack/AMI: Yes (2018)


Hx Congestive Heart Failure: Yes


Hx Diabetes: No


Hx Asthma: No


Hx COPD: No





- Surgical History


Past Surgical History?: Yes


Additional Surgical History: defribillator





- Social History


Smoking Status: Current Every Day Smoker


Substance Use Type: Alcohol





- Medications


Home Medications: 


                                Home Medications











 Medication  Instructions  Recorded  Confirmed  Last Taken  Type


 


Digoxin [Lanoxin] 0.125 mg PO DAILY@1700 #30 tablet 10/26/18  Unknown Rx


 


carvediloL [Coreg] 3.125 mg PO BID #60 tablet 10/26/18  Unknown Rx


 


cephALEXin [Keflex] 500 mg PO Q12HR 10 Days  cap 10/26/18  Unknown Rx


 


lisinopriL [Zestril TAB] 5 mg PO QDAY #30 tablet 10/26/18  Unknown Rx


 


Acetaminophen [Tylenol Arthritis] 650 mg PO Q6HR PRN #30 tablet.er 12/25/18  

Unknown Rx


 


Chlorhexidine Mouthwash [Peridex] 15 ml MM BID #1 bottle 12/25/18  Unknown Rx


 


Furosemide [Lasix TAB] 20 mg PO BID #60 tablet 12/25/18  Unknown Rx


 


Ibuprofen [Motrin] 600 mg PO Q8H PRN #30 tablet 12/25/18  Unknown Rx


 


HYDROcodone/APAP 5-325 [Norco 1 each PO Q6H PRN #10 tablet 05/25/20  Unknown Rx





5-325 mg TAB]     


 


Nitrofurantoin Mono/M-Cryst 100 mg PO Q12HR #14 capsule 05/25/20  Unknown Rx





[Macrobid CAP]     














ED Review of Systems


ROS: 


Stated complaint: ABD PAIN


Other details as noted in HPI





Comment: All other systems reviewed and negative


Constitutional: denies: chills, fever


Respiratory: denies: cough, shortness of breath


Cardiovascular: denies: chest pain, palpitations


Gastrointestinal: abdominal pain.  denies: vomiting, diarrhea, constipation


Genitourinary: denies: dysuria, discharge


Musculoskeletal: denies: back pain, arthralgia


Skin: denies: rash, lesions


Neurological: denies: headache, weakness





Physical Exam





- Physical Exam


Vital Signs: 


                                   Vital Signs











  05/25/20





  08:49


 


Temperature 98.6 F


 


Pulse Rate 83


 


Respiratory 20





Rate 


 


Blood Pressure 134/84


 


O2 Sat by Pulse 100





Oximetry 











Physical Exam: 





GENERAL: The patient is well-developed well-nourished.


HENT: Normocephalic.  Atraumatic.    Patient has moist mucous membranes.


EYES: Extraocular motions are intact.


NECK: Supple. Trachea is midline.


CHEST/LUNGS: Clear to auscultation.  There is no respiratory distress noted.


HEART/CARDIOVASCULAR: Regular.  There is no tachycardia.  


ABDOMEN: Abdomen is soft.  There is some left lower quadrant abdominal 

tenderness to palpation.  No guarding.  Patient has normal bowel sounds.  There 

is no abdominal distention.


SKIN: Skin is warm and dry. 


NEURO: The patient is awake, alert, and oriented.  The patient is cooperative.  

The patient has no focal neurologic deficits.  Normal speech.


MUSCULOSKELETAL: There is no tenderness or deformity. There is no evidence of 

acute injury.





ED Course


                                   Vital Signs











  05/25/20





  08:49


 


Temperature 98.6 F


 


Pulse Rate 83


 


Respiratory 20





Rate 


 


Blood Pressure 134/84


 


O2 Sat by Pulse 100





Oximetry 














ED Medical Decision Making





- Lab Data


Result diagrams: 


                                 05/25/20 10:54





                                 05/25/20 10:54





- Radiology Data


Radiology results: report reviewed, image reviewed


interpreted by me: 





Abdominal x-ray shows nonspecific nonobstructive bowel gas.





CT ABDOMEN AND PELVIS WITH CONTRAST INDICATION / CLINICAL INFORMATION: One week 

of left lower quadrant pain. TECHNIQUE: Axial CT images were obtained through 

the abdomen and pelvis after 100 mL Omnipaque 300 IV contrast. All CT scans at 

this location are performed using CT dose reduction for ALARA by means of 

automated exposure control. COMPARISON: Abdominal radiograph from earlier today 

FINDINGS: LOWER CHEST: No significant abnormality. LIVER: Circumscribed, relati

vely hyperenhancing mass lesion with peripheral vascularity in the left hepatic 

lobe measures 5 x 5.9 x 3.4 cm (AP by TV by CC). Nearby left lobe lesion 

measuring 2.5 cm in diameter is visible on series 2 image 20, with central 

hypoattenuation and peripheral serpiginous hyperenhancement. In the right lobe 

centrally on series 2 image 36 is a homogeneously hypoattenuating lesion without

a perceptible wall that measures 2.7 cm in greatest dimension. Normal liver size

and contour. BILIARY SYSTEM: No significant abnormality. PANCREAS: No 

significant abnormality. SPLEEN: No significant abnormality. ADRENALS: No 

significant abnormality. KIDNEYS and URETERS: No significant abnormality. 

STOMACH / BOWEL: No significant abnormality. No acute inflammation or 

obstruction. Normal appendix. PERITONEUM: No free fluid. No free air. No fluid 

collection. LYMPH NODES: No adenopathy. VASCULAR STRUCTURES: No significant 

abnormality. URINARY BLADDER: No significant abnormality. REPRODUCTIVE ORGANS: 

Uterus contains multiple fibroids, some of which have coarse degenerative 

calcifications. ADDITIONAL FINDINGS: None. SKELETAL SYSTEM: Degenerative changes

in the lumbar spine. No acute finding. IMPRESSION: 1. No acute finding likely to

explain the patient's left lower quadrant pain. 2. Enlarged, multi fibroid 

uterus. 3. There are at least 3 focal lesions in the liver, each with different 

appearance as described above. Dedicated liver protocol CT or MRI is recommended

on a nonemergent basis for complete characterization of these lesions. 





- Medical Decision Making





This patient presents to the emergency department with a complaint of some left 

lower quadrant abdominal pain that has been going on for the past month 

intermittently but worsened over the past few days and has become more constant.

 On examination she does have some reproducible left lower quadrant abdominal 

pain to palpation but the abdomen is otherwise soft, nondistended and nontoxic 

in appearance.  Labs have been grossly unremarkable including CBC and metabolic 

panel.  Urinalysis shows 9 white blood cells in the urine along with trace 

leukocyte Estrace and nitrites and therefore she will be treated for a mild 

urinary tract infection.  Abdominal x-ray shows nonspecific nonobstructive bowel

gas.  CT scan of the abdomen and pelvis with IV contrast shows a fibroid uterus 

and some nonspecific liver lesions.  Patient appears safe for discharge home at 

this time.  She has been given a prescription for some pain medication and 

referrals for primary care and gastroenterology.  She will return to the ER with

any worsening of her symptoms or any acute distress.


Critical Care Time: No


Critical care attestation.: 


If time is entered above; I have spent that time in minutes in the direct care 

of this critically ill patient, excluding procedure time.








ED Disposition


Clinical Impression: 


 LLQ abdominal pain





Fibroid uterus


Qualifiers:


 Uterine leiomyoma location: unspecified location Qualified Code(s): D25.9 - 

Leiomyoma of uterus, unspecified





UTI (urinary tract infection)


Qualifiers:


 Urinary tract infection type: acute cystitis Hematuria presence: without 

hematuria Qualified Code(s): N30.00 - Acute cystitis without hematuria





Disposition: DC-01 TO HOME OR SELFCARE


Is pt being admited?: No


Condition: Stable


Instructions:  Uterine Fibroids (ED), Urinary Tract Infection in Women (ED), 

Abdominal Pain (ED)


Additional Instructions: 


Please follow-up with a primary care physician.





I am giving you a referral for Dr. Zaragoza, a local gastroenterologist who is part

of [facility], to follow-up regarding your abdominal pains and the

nonspecific liver lesions found on your CT scan today.





I am giving you a prescription for Macrobid/nitrofurantoin to treat your mild 

urinary tract infection.





Return to the emergency department with any worsening of your symptoms or any 

acute distress.





You have been prescribed a medication that is sedating and therefore should not 

be taken prior to driving, working, and responsible for children and in no way 

should be mixed with alcohol of any quantity.


Prescriptions: 


Nitrofurantoin Mono/M-Cryst [Macrobid CAP] 100 mg PO Q12HR #14 capsule


HYDROcodone/APAP 5-325 [Norco 5-325 mg TAB] 1 each PO Q6H PRN #10 tablet


 PRN Reason: Pain, Moderate (4-6)


Referrals: 


PRIMARY CARE,MD [Primary Care Provider] - 3-5 Days


CAMERON ZARAGOZA MD [Staff Physician] - 3-5 Days


FRANCE CANTU MD [Staff Physician] - 3-5 Days


[facility] [Provider Group] - 3-5 Days


Time of Disposition: 14:13

## 2020-05-25 NOTE — XRAY REPORT
ABDOMEN 2 VIEWS



INDICATION / CLINICAL INFORMATION:

aBD PAIN.



COMPARISON:

None available.



FINDINGS:

Nonspecific bowel gas pattern. No definite evidence of obstruction or pneumoperitoneum. There are bhavin
cified fibroids in the uterus.



Signer Name: Óscar Rodriguez MD FACR 

Signed: 5/25/2020 11:42 AM

Workstation Name: [id number]

## 2020-05-25 NOTE — CAT SCAN REPORT
CT ABDOMEN AND PELVIS WITH CONTRAST



INDICATION / CLINICAL INFORMATION:

One week of left lower quadrant pain.



TECHNIQUE:

Axial CT images were obtained through the abdomen and pelvis after 100 mL Omnipaque 300 IV contrast. 
 All CT scans at this location are performed using CT dose reduction for ALARA by means of automated 
exposure control. 



COMPARISON:

Abdominal radiograph from earlier today



FINDINGS:

LOWER CHEST: No significant abnormality.

LIVER: Circumscribed, relatively hyperenhancing mass lesion with peripheral vascularity in the left h
epatic lobe measures 5 x 5.9 x 3.4 cm (AP by TV by CC). Nearby left lobe lesion measuring 2.5 cm in d
iameter is visible on series 2 image 20, with central hypoattenuation and peripheral serpiginous hype
renhancement. In the right lobe centrally on series 2 image 36 is a homogeneously hypoattenuating les
ion without a perceptible wall that measures 2.7 cm in greatest dimension. Normal liver size and cont
our.

BILIARY SYSTEM: No significant abnormality.  

PANCREAS: No significant abnormality.

SPLEEN: No significant abnormality.

ADRENALS: No significant abnormality.

KIDNEYS and URETERS: No significant abnormality.



STOMACH / BOWEL: No significant abnormality. No acute inflammation or obstruction. Normal appendix.

PERITONEUM: No free fluid. No free air. No fluid collection.

LYMPH NODES: No adenopathy.

VASCULAR STRUCTURES: No significant abnormality. 



URINARY BLADDER: No significant abnormality.

REPRODUCTIVE ORGANS: Uterus contains multiple fibroids, some of which have coarse degenerative calcif
ications.



ADDITIONAL FINDINGS: None.



SKELETAL SYSTEM: Degenerative changes in the lumbar spine. No acute finding.



IMPRESSION:

1.  No acute finding likely to explain the patient's left lower quadrant pain.

2.  Enlarged, multi fibroid uterus.

3.  There are at least 3 focal lesions in the liver, each with different appearance as described abov
e. Dedicated liver protocol CT or MRI is recommended on a nonemergent basis for complete characteriza
tion of these lesions.



Signer Name: Surjit Ochoa MD 

Signed: 5/25/2020 1:42 PM

Workstation Name: [id number]

## 2020-06-14 ENCOUNTER — HOSPITAL ENCOUNTER (EMERGENCY)
Dept: HOSPITAL 5 - ED | Age: 55
Discharge: LEFT BEFORE DISCHARGE | End: 2020-06-14
Payer: SELF-PAY

## 2020-06-14 VITALS — DIASTOLIC BLOOD PRESSURE: 83 MMHG | SYSTOLIC BLOOD PRESSURE: 140 MMHG

## 2020-06-14 DIAGNOSIS — M79.604: Primary | ICD-10-CM

## 2020-06-14 DIAGNOSIS — Z53.21: ICD-10-CM

## 2020-06-14 NOTE — EMERGENCY DEPARTMENT REPORT
Chief Complaint: Extremity Injury, Lower


Stated Complaint: TROUBLE WALKING


Time Seen by Provider: 06/14/20 13:59





- HPI


History of Present Illness: 





Patient is a 55 yo female who presents to the Ed with c/o of bilateral LE pain 

for a month 


she states that her legs have been getting "smaller"


no leg swelling 


She was concerned because she had been reading on the Internet this morning and 

she thought she had peripheral vascular disease based on googling





Vitals are stable








On exam:


No tenderness to palpation of bilateral lower extremities, full range of motion,

no skin changes, no ulcerations, no varicose veins, no erythema, no leg swelling

bilaterally, neurovascularly intact, strong pulses








No obvious abnormality on physical examination


No clinical signs or symptoms of DVT or significant arterial occlusion


advised pt please follow up with a primary care doctor for further evaluation. 

return to the emergency room for any new or worsening symptoms. 


Discussed the importance of follow-up


Discussed strict return precautions with patient


Medical screening performed and there is no threat to life or limb at this time





- Exam


Vital Signs: 


                                   Vital Signs











  06/14/20





  13:23


 


Temperature 97.8 F


 


Pulse Rate 98 H


 


Respiratory 14





Rate 


 


Blood Pressure 140/83


 


O2 Sat by Pulse 98





Oximetry 











MSE screening note: 


Focused history and physical exam performed.














ED Disposition for MSE


Clinical Impression: 


 Bilateral leg pain





Disposition: Z-07 MED SCREENING EXAM-LEFT


Is pt being admited?: No


Does the pt Need Aspirin: No


Condition: Stable


Instructions:  Arthralgia (ED)


Additional Instructions: 


please follow up with a primary care doctor for further evaluation. return to 

the emergency room for any new or worsening symptoms. 


Referrals: 


TAVIA GAMEZ MD [Staff Physician] - 3-5 Days


[facility] [Provider Group] - 3-5 Days


[facility] [Outside] - 3-5 Days


[facility] [Outside] - 3-5 Days


Time of Disposition: 14:04


Print Language: ENGLISH

## 2020-06-14 NOTE — EVENT NOTE
ED Screening Note


ED Screening Note: 





she is c/o of bilateral LE pain for a month 


she states that her legs have been getting smaller


no leg swelling

## 2022-05-29 NOTE — PROGRESS NOTE
Assessment and Plan





Cardiac arrest with ROSC


Syncope


h/o Cardiomyopathy


h/o substance abuse disorder.


Aspiration pneumonitis


Cardiomyopathy EF 25% on recent Echo





- for AICD placement tomorrow


- PT/OT


- continue Cardio-protective measures


- Substance abuse counselling done


- prn bronchodilators 


- Supplemental oxygen as indicated for O2 sats<88%


- continue other care per attending / other consultants





... will see prn








Subjective


Date of service: 10/23/18


Principal diagnosis: Cardiac arrest with ROSC; Syncope; CMOP; Aspiration 

pneumonitis


Interval history: 





Patient is seen today for: Cardiac arrest with ROSC; Syncope; h/o Cardiomyopathy

; h/o substance abuse disorder.; Aspiration pneumonitis





Seen and examined at bedside; 24hour events reviewed; nursing and respiratory 

care staff consulted; no adverse overnight events reported to me; resting 

peacefully i bed; family visiting; denies acute chest pains or palpitations





Objective


 Vital Signs - 12hr











  10/23/18 10/23/18 10/23/18





  04:50 06:03 08:00


 


Temperature 97.7 F  97.8 F


 


Pulse Rate 70  69


 


Pulse Rate [   





Apical]   


 


Pulse Rate [   





Left Dorsalis   





Pedis]   


 


Pulse Rate [  90 





Left Radial]   


 


Pulse Rate [   





Right Dorsalis   





Pedis]   


 


Pulse Rate [   





Right Radial]   


 


Respiratory 20 17 17





Rate   


 


Blood Pressure 117/80  125/81


 


O2 Sat by Pulse 96  97





Oximetry   














  10/23/18 10/23/18 10/23/18





  10:00 10:13 10:38


 


Temperature   


 


Pulse Rate 81 69 


 


Pulse Rate [   69





Apical]   


 


Pulse Rate [   69





Left Dorsalis   





Pedis]   


 


Pulse Rate [   69





Left Radial]   


 


Pulse Rate [   69





Right Dorsalis   





Pedis]   


 


Pulse Rate [   69





Right Radial]   


 


Respiratory   17





Rate   


 


Blood Pressure  125/89 


 


O2 Sat by Pulse   97





Oximetry   














  10/23/18





  11:13


 


Temperature 98.4 F


 


Pulse Rate 69


 


Pulse Rate [ 





Apical] 


 


Pulse Rate [ 





Left Dorsalis 





Pedis] 


 


Pulse Rate [ 





Left Radial] 


 


Pulse Rate [ 





Right Dorsalis 





Pedis] 


 


Pulse Rate [ 





Right Radial] 


 


Respiratory 18





Rate 


 


Blood Pressure 106/66


 


O2 Sat by Pulse 99





Oximetry 











Constitutional: no acute distress, alert, other


Eyes: non-icteric


ENT: oropharynx moist


Neck: supple, no lymphadenopathy


Effort: normal


Ascultation: Bilateral: clear


Tactile fremitus: Bilateral: normal


Cardiovascular: regular rate and rhythm, other (paced)


Gastrointestinal: normoactive bowel sounds, soft, non-tender, non-distended


Integumentary: normal


Extremities: no cyanosis, no edema, pink and warm, pulses normal, no ischemia 

or petechiae


Neurologic: normal mental status, non-focal exam, pupils equal and round, motor 

strength normal and


Psychiatric: mood appropriate, affect normal


CBC and BMP: 


 10/23/18 03:51





 10/23/18 03:51


ABG, PT/INR, D-dimer: 


PT/INR, D-dimer











PT  13.4 Sec. (12.2-14.9)   10/23/18  03:51    


 


INR  0.97  (0.87-1.13)   10/23/18  03:51    


 


D-Dimer  558.12 ng/mlDDU (0-234)  H  10/16/18  15:00    








Abnormal lab findings: 


 Abnormal Labs











  10/16/18 10/16/18 10/16/18





  00:37 00:37 05:48


 


WBC  12.2 H  


 


RBC  3.59 L  


 


MCH  33 H  


 


RDW   


 


Lymph % (Auto)   


 


Mono % (Auto)  8.2 H  


 


Mono #  1.0 H  


 


Seg Neutrophils %  71.8 H  


 


Seg Neutrophils #  8.8 H  


 


D-Dimer   


 


Sodium   


 


Potassium   2.8 L* 


 


Chloride   


 


Carbon Dioxide   


 


Creatinine   


 


Glucose   124 H 


 


Total Creatine Kinase    530 H


 


CK-MB (CK-2)    5.9 H














  10/16/18 10/16/18 10/17/18





  15:00 15:00 07:17


 


WBC   


 


RBC   


 


MCH   


 


RDW   


 


Lymph % (Auto)   


 


Mono % (Auto)   


 


Mono #   


 


Seg Neutrophils %   


 


Seg Neutrophils #   


 


D-Dimer   558.12 H 


 


Sodium   


 


Potassium   


 


Chloride   


 


Carbon Dioxide  20 L  


 


Creatinine  0.6 L   0.5 L


 


Glucose  120 H   110 H


 


Total Creatine Kinase   


 


CK-MB (CK-2)   














  10/18/18 10/18/18 10/22/18





  04:33 04:33 04:19


 


WBC   


 


RBC  3.25 L   3.48 L


 


MCH  33 H   33 H


 


RDW   


 


Lymph % (Auto)    35.9 H


 


Mono % (Auto)  10.4 H   13.9 H


 


Mono #  0.9 H   1.0 H


 


Seg Neutrophils %   


 


Seg Neutrophils #   


 


D-Dimer   


 


Sodium   


 


Potassium   


 


Chloride   


 


Carbon Dioxide   


 


Creatinine   0.6 L 


 


Glucose   113 H 


 


Total Creatine Kinase   


 


CK-MB (CK-2)   














  10/22/18 10/23/18 10/23/18





  04:19 03:51 03:51


 


WBC   


 


RBC   3.61 L 


 


MCH   


 


RDW   13.1 L 


 


Lymph % (Auto)   


 


Mono % (Auto)   14.5 H 


 


Mono #   1.1 H 


 


Seg Neutrophils %   


 


Seg Neutrophils #   


 


D-Dimer   


 


Sodium    133 L


 


Potassium   


 


Chloride  97.1 L   96.4 L


 


Carbon Dioxide   


 


Creatinine  0.6 L   0.5 L


 


Glucose   


 


Total Creatine Kinase   


 


CK-MB (CK-2)   











Allied health notes reviewed: nursing Warm/Dry